# Patient Record
Sex: FEMALE | Race: WHITE | NOT HISPANIC OR LATINO | Employment: OTHER | URBAN - METROPOLITAN AREA
[De-identification: names, ages, dates, MRNs, and addresses within clinical notes are randomized per-mention and may not be internally consistent; named-entity substitution may affect disease eponyms.]

---

## 2023-12-18 ENCOUNTER — HOSPITAL ENCOUNTER (INPATIENT)
Facility: HOSPITAL | Age: 68
LOS: 4 days | DRG: 988 | End: 2023-12-22
Attending: EMERGENCY MEDICINE | Admitting: INTERNAL MEDICINE
Payer: MEDICARE

## 2023-12-18 DIAGNOSIS — E11.622 DIABETIC ULCER OF ANKLE: ICD-10-CM

## 2023-12-18 DIAGNOSIS — R07.9 CHEST PAIN: ICD-10-CM

## 2023-12-18 DIAGNOSIS — I70.249: ICD-10-CM

## 2023-12-18 DIAGNOSIS — M25.571 ANKLE PAIN, RIGHT: ICD-10-CM

## 2023-12-18 DIAGNOSIS — E11.621 DIABETIC ULCER OF RIGHT HEEL ASSOCIATED WITH TYPE 2 DIABETES MELLITUS, UNSPECIFIED ULCER STAGE: Primary | ICD-10-CM

## 2023-12-18 DIAGNOSIS — L97.309 DIABETIC ULCER OF ANKLE: ICD-10-CM

## 2023-12-18 DIAGNOSIS — L02.91 ABSCESS: ICD-10-CM

## 2023-12-18 DIAGNOSIS — L97.419 DIABETIC ULCER OF RIGHT HEEL ASSOCIATED WITH TYPE 2 DIABETES MELLITUS, UNSPECIFIED ULCER STAGE: Primary | ICD-10-CM

## 2023-12-18 PROBLEM — T87.9 BKA STUMP COMPLICATION: Status: ACTIVE | Noted: 2023-12-18

## 2023-12-18 PROBLEM — E11.51 DIABETES TYPE 2 WITH ATHEROSCLEROSIS OF ARTERIES OF EXTREMITIES: Status: ACTIVE | Noted: 2023-12-18

## 2023-12-18 PROBLEM — I70.209 DIABETES TYPE 2 WITH ATHEROSCLEROSIS OF ARTERIES OF EXTREMITIES: Status: ACTIVE | Noted: 2023-12-18

## 2023-12-18 LAB
ALBUMIN SERPL BCP-MCNC: 3.1 G/DL (ref 3.5–5.2)
ALP SERPL-CCNC: 121 U/L (ref 55–135)
ALT SERPL W/O P-5'-P-CCNC: 30 U/L (ref 10–44)
ANION GAP SERPL CALC-SCNC: 13 MMOL/L (ref 8–16)
AST SERPL-CCNC: 31 U/L (ref 10–40)
BASOPHILS # BLD AUTO: 0.03 K/UL (ref 0–0.2)
BASOPHILS NFR BLD: 0.2 % (ref 0–1.9)
BILIRUB SERPL-MCNC: 0.4 MG/DL (ref 0.1–1)
BUN SERPL-MCNC: 10 MG/DL (ref 8–23)
CALCIUM SERPL-MCNC: 8.7 MG/DL (ref 8.7–10.5)
CHLORIDE SERPL-SCNC: 98 MMOL/L (ref 95–110)
CO2 SERPL-SCNC: 24 MMOL/L (ref 23–29)
CREAT SERPL-MCNC: 0.4 MG/DL (ref 0.5–1.4)
DIFFERENTIAL METHOD BLD: ABNORMAL
EOSINOPHIL # BLD AUTO: 0 K/UL (ref 0–0.5)
EOSINOPHIL NFR BLD: 0 % (ref 0–8)
ERYTHROCYTE [DISTWIDTH] IN BLOOD BY AUTOMATED COUNT: 12.7 % (ref 11.5–14.5)
EST. GFR  (NO RACE VARIABLE): >60 ML/MIN/1.73 M^2
GLUCOSE SERPL-MCNC: 198 MG/DL (ref 70–110)
GLUCOSE SERPL-MCNC: 221 MG/DL (ref 70–110)
GLUCOSE SERPL-MCNC: 233 MG/DL (ref 70–110)
GLUCOSE SERPL-MCNC: 245 MG/DL (ref 70–110)
HCT VFR BLD AUTO: 37 % (ref 37–48.5)
HGB BLD-MCNC: 12.1 G/DL (ref 12–16)
IMM GRANULOCYTES # BLD AUTO: 0.11 K/UL (ref 0–0.04)
IMM GRANULOCYTES NFR BLD AUTO: 0.7 % (ref 0–0.5)
LACTATE SERPL-SCNC: 1.1 MMOL/L (ref 0.5–1.9)
LACTATE SERPL-SCNC: 1.6 MMOL/L (ref 0.5–1.9)
LYMPHOCYTES # BLD AUTO: 2.5 K/UL (ref 1–4.8)
LYMPHOCYTES NFR BLD: 16.2 % (ref 18–48)
MAGNESIUM SERPL-MCNC: 1.6 MG/DL (ref 1.6–2.6)
MCH RBC QN AUTO: 27.7 PG (ref 27–31)
MCHC RBC AUTO-ENTMCNC: 32.7 G/DL (ref 32–36)
MCV RBC AUTO: 85 FL (ref 82–98)
MONOCYTES # BLD AUTO: 1 K/UL (ref 0.3–1)
MONOCYTES NFR BLD: 6.3 % (ref 4–15)
NEUTROPHILS # BLD AUTO: 11.8 K/UL (ref 1.8–7.7)
NEUTROPHILS NFR BLD: 76.6 % (ref 38–73)
NRBC BLD-RTO: 0 /100 WBC
PLATELET # BLD AUTO: 527 K/UL (ref 150–450)
PMV BLD AUTO: 9.5 FL (ref 9.2–12.9)
POTASSIUM SERPL-SCNC: 3.6 MMOL/L (ref 3.5–5.1)
PROT SERPL-MCNC: 7 G/DL (ref 6–8.4)
RBC # BLD AUTO: 4.37 M/UL (ref 4–5.4)
SODIUM SERPL-SCNC: 135 MMOL/L (ref 136–145)
WBC # BLD AUTO: 15.44 K/UL (ref 3.9–12.7)

## 2023-12-18 PROCEDURE — 83605 ASSAY OF LACTIC ACID: CPT | Performed by: EMERGENCY MEDICINE

## 2023-12-18 PROCEDURE — 82962 GLUCOSE BLOOD TEST: CPT

## 2023-12-18 PROCEDURE — 83735 ASSAY OF MAGNESIUM: CPT | Performed by: EMERGENCY MEDICINE

## 2023-12-18 PROCEDURE — 36415 COLL VENOUS BLD VENIPUNCTURE: CPT | Performed by: EMERGENCY MEDICINE

## 2023-12-18 PROCEDURE — 83036 HEMOGLOBIN GLYCOSYLATED A1C: CPT | Performed by: INTERNAL MEDICINE

## 2023-12-18 PROCEDURE — 80053 COMPREHEN METABOLIC PANEL: CPT | Performed by: EMERGENCY MEDICINE

## 2023-12-18 PROCEDURE — 87040 BLOOD CULTURE FOR BACTERIA: CPT | Mod: 59 | Performed by: EMERGENCY MEDICINE

## 2023-12-18 PROCEDURE — 96365 THER/PROPH/DIAG IV INF INIT: CPT

## 2023-12-18 PROCEDURE — 83605 ASSAY OF LACTIC ACID: CPT | Mod: 91 | Performed by: INTERNAL MEDICINE

## 2023-12-18 PROCEDURE — 25000003 PHARM REV CODE 250: Performed by: INTERNAL MEDICINE

## 2023-12-18 PROCEDURE — 12000002 HC ACUTE/MED SURGE SEMI-PRIVATE ROOM

## 2023-12-18 PROCEDURE — 63600175 PHARM REV CODE 636 W HCPCS: Performed by: INTERNAL MEDICINE

## 2023-12-18 PROCEDURE — 85025 COMPLETE CBC W/AUTO DIFF WBC: CPT | Performed by: EMERGENCY MEDICINE

## 2023-12-18 PROCEDURE — 36415 COLL VENOUS BLD VENIPUNCTURE: CPT | Performed by: INTERNAL MEDICINE

## 2023-12-18 PROCEDURE — 99285 EMERGENCY DEPT VISIT HI MDM: CPT | Mod: 25

## 2023-12-18 PROCEDURE — 99223 1ST HOSP IP/OBS HIGH 75: CPT | Mod: ,,, | Performed by: STUDENT IN AN ORGANIZED HEALTH CARE EDUCATION/TRAINING PROGRAM

## 2023-12-18 PROCEDURE — 63600175 PHARM REV CODE 636 W HCPCS: Performed by: EMERGENCY MEDICINE

## 2023-12-18 PROCEDURE — 25500020 PHARM REV CODE 255: Performed by: INTERNAL MEDICINE

## 2023-12-18 RX ORDER — HYDROMORPHONE HYDROCHLORIDE 1 MG/ML
1 INJECTION, SOLUTION INTRAMUSCULAR; INTRAVENOUS; SUBCUTANEOUS EVERY 4 HOURS PRN
Status: DISCONTINUED | OUTPATIENT
Start: 2023-12-18 | End: 2023-12-22 | Stop reason: HOSPADM

## 2023-12-18 RX ORDER — IBUPROFEN 200 MG
24 TABLET ORAL
Status: DISCONTINUED | OUTPATIENT
Start: 2023-12-18 | End: 2023-12-22 | Stop reason: HOSPADM

## 2023-12-18 RX ORDER — GLUCAGON 1 MG
1 KIT INJECTION
Status: DISCONTINUED | OUTPATIENT
Start: 2023-12-18 | End: 2023-12-22 | Stop reason: HOSPADM

## 2023-12-18 RX ORDER — INSULIN ASPART 100 [IU]/ML
0-10 INJECTION, SOLUTION INTRAVENOUS; SUBCUTANEOUS
Status: DISCONTINUED | OUTPATIENT
Start: 2023-12-18 | End: 2023-12-22 | Stop reason: HOSPADM

## 2023-12-18 RX ORDER — SODIUM CHLORIDE 0.9 % (FLUSH) 0.9 %
10 SYRINGE (ML) INJECTION EVERY 12 HOURS PRN
Status: DISCONTINUED | OUTPATIENT
Start: 2023-12-18 | End: 2023-12-22 | Stop reason: HOSPADM

## 2023-12-18 RX ORDER — IBUPROFEN 200 MG
16 TABLET ORAL
Status: DISCONTINUED | OUTPATIENT
Start: 2023-12-18 | End: 2023-12-22 | Stop reason: HOSPADM

## 2023-12-18 RX ORDER — ENOXAPARIN SODIUM 100 MG/ML
40 INJECTION SUBCUTANEOUS EVERY 24 HOURS
Status: DISCONTINUED | OUTPATIENT
Start: 2023-12-18 | End: 2023-12-22 | Stop reason: HOSPADM

## 2023-12-18 RX ORDER — HYDROMORPHONE HYDROCHLORIDE 1 MG/ML
1 INJECTION, SOLUTION INTRAMUSCULAR; INTRAVENOUS; SUBCUTANEOUS EVERY 6 HOURS PRN
Status: DISCONTINUED | OUTPATIENT
Start: 2023-12-18 | End: 2023-12-18

## 2023-12-18 RX ORDER — VANCOMYCIN HCL IN 5 % DEXTROSE 1G/250ML
2000 PLASTIC BAG, INJECTION (ML) INTRAVENOUS ONCE
Status: COMPLETED | OUTPATIENT
Start: 2023-12-18 | End: 2023-12-18

## 2023-12-18 RX ORDER — NALOXONE HCL 0.4 MG/ML
0.02 VIAL (ML) INJECTION
Status: DISCONTINUED | OUTPATIENT
Start: 2023-12-18 | End: 2023-12-22 | Stop reason: HOSPADM

## 2023-12-18 RX ADMIN — VANCOMYCIN HYDROCHLORIDE 2000 MG: 1 INJECTION, POWDER, LYOPHILIZED, FOR SOLUTION INTRAVENOUS at 03:12

## 2023-12-18 RX ADMIN — ENOXAPARIN SODIUM 40 MG: 40 INJECTION SUBCUTANEOUS at 04:12

## 2023-12-18 RX ADMIN — HYDROMORPHONE HYDROCHLORIDE 1 MG: 1 INJECTION, SOLUTION INTRAMUSCULAR; INTRAVENOUS; SUBCUTANEOUS at 04:12

## 2023-12-18 RX ADMIN — INSULIN ASPART 2 UNITS: 100 INJECTION, SOLUTION INTRAVENOUS; SUBCUTANEOUS at 08:12

## 2023-12-18 RX ADMIN — HYDROMORPHONE HYDROCHLORIDE 1 MG: 1 INJECTION, SOLUTION INTRAMUSCULAR; INTRAVENOUS; SUBCUTANEOUS at 10:12

## 2023-12-18 RX ADMIN — IOHEXOL 100 ML: 350 INJECTION, SOLUTION INTRAVENOUS at 05:12

## 2023-12-18 RX ADMIN — PIPERACILLIN SODIUM AND TAZOBACTAM SODIUM 3.38 G: 3; .375 INJECTION, POWDER, LYOPHILIZED, FOR SOLUTION INTRAVENOUS at 08:12

## 2023-12-18 NOTE — ASSESSMENT & PLAN NOTE
"Patient's FSGs are uncontrolled due to hyperglycemia on current medication regimen.  Last A1c reviewed- No results found for: "LABA1C", "HGBA1C"  Most recent fingerstick glucose reviewed- No results for input(s): "POCTGLUCOSE" in the last 24 hours.  Current correctional scale  High  Increase anti-hyperglycemic dose as follows-   Antihyperglycemics (From admission, onward)      None          Hold Oral hypoglycemics while patient is in the hospital.  A1c and SSI   May need to start insulin   Woody consistency at calf and the whole leg and worrisome for compartment syndrome and CT stat and consult surgeon     "

## 2023-12-18 NOTE — ED PROVIDER NOTES
Encounter Date: 12/18/2023       History     Chief Complaint   Patient presents with    Wound Check     Diabetic ulcer to right ankle. States it has been draining for a few days. Has not seen wound care and is concerned for infection. Area appears to be red swollen and draining.      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  No past surgical history on file.  No family history on file.     Review of Systems   Constitutional:  Positive for fatigue. Negative for chills and fever.   HENT:  Negative for congestion.    Respiratory:  Negative for shortness of breath.    Cardiovascular:  Negative for chest pain.   Skin:  Positive for color change and wound.   All other systems reviewed and are negative.      Physical Exam     Initial Vitals [12/18/23 1224]   BP Pulse Resp Temp SpO2   (!) 150/80 82 18 99.1 °F (37.3 °C) 100 %      MAP       --         Physical Exam    Constitutional: She appears well-developed and well-nourished. No distress.   HENT:   Head: Normocephalic and atraumatic.   Right Ear: External ear normal.   Left Ear: External ear normal.   Mouth/Throat: Oropharynx is clear and moist.   Eyes: Conjunctivae and EOM are normal. Pupils are equal, round, and reactive to light.   Neck: Neck supple.   Normal range of motion.  Cardiovascular:  Normal rate, regular rhythm, normal heart sounds and intact distal pulses.           Pulmonary/Chest: Breath sounds normal. No respiratory distress.   Abdominal: Abdomen is soft. Bowel sounds are normal. There is no abdominal tenderness.    Musculoskeletal:         General: No edema. Normal range of motion.      Cervical back: Normal range of motion and neck supple.      Comments: Ulceration noted to the posterior aspect of the right ankle with surrounding erythema and drainage tenderness to the area +1 dorsalis pedis and posterior tibial pulses     Neurological: She is alert and oriented to person, place, and time. She has normal strength. GCS score is 15. GCS eye subscore is 4. GCS verbal subscore is 5. GCS motor subscore is 6.   Skin: Skin is warm and dry. Capillary refill takes less than 2 seconds. No rash noted.   Psychiatric: She has a normal mood and affect. Her behavior is normal.         ED Course   Procedures  Labs Reviewed   CBC W/ AUTO DIFFERENTIAL - Abnormal; Notable for the following components:       Result Value    WBC 15.44 (*)     Platelets 527 (*)     Immature Granulocytes 0.7 (*)     Gran # (ANC) 11.8 (*)     Immature Grans (Abs) 0.11 (*)     Gran % 76.6 (*)     Lymph % 16.2 (*)     All other components within normal limits   CULTURE, BLOOD   CULTURE, BLOOD    Narrative:     Collection has been rescheduled by [facility] at 12/18/2023 13:58 Reason:   Patient refused notified ALBIN alexander   Collection has been rescheduled by [facility] at 12/18/2023 13:58 Reason:   Patient refused notified ALBIN alexander    LACTIC ACID, PLASMA   COMPREHENSIVE METABOLIC PANEL   MAGNESIUM          Imaging Results              X-Ray Ankle Complete Right (Final result)  Result time 12/18/23 14:34:46      Final result by Yohan Hamm MD (12/18/23 14:34:46)                   Narrative:    Right ankle 3 views    CLINICAL DATA: Right ankle pain    FINDINGS: 3 views are negative for fracture or dislocation. No osseous destructive lesion or significant arthritic change is identified.    Atherosclerotic calcification of the posterior tibial artery is noted.    IMPRESSION:  1. No acute radiographic abnormalities.  2. Mild sclerotic calcification of the posterior tibial  artery.    Electronically signed by:  Yohan Hamm MD  12/18/2023 02:34 PM [facility] [place] Workstation: [id number]                                     Medications   vancomycin - pharmacy to dose (has no administration in time range)   vancomycin in dextrose 5 % 1 gram/250 mL IVPB 2,000 mg (has no administration in time range)     Medical Decision Making  Laboratory evaluation reviewed I have discussed patient's findings with Dr. Iniguez who will evaluate patient in the emergency department for admission I have ordered vancomycin in the ER I have obtained lactate and cultures    Amount and/or Complexity of Data Reviewed  Labs: ordered.  Radiology: ordered.    Risk  Prescription drug management.                                      Clinical Impression:  Final diagnoses:  [M25.571] Ankle pain, right  [E11.621, L97.419] Diabetic ulcer of right heel associated with type 2 diabetes mellitus, unspecified ulcer stage (Primary)          ED Disposition Condition    Admit Stable                Bakari Esquivel MD  12/18/23 1839

## 2023-12-18 NOTE — PHARMACY MED REC
"              .        Admission Medication History     The home medication history was taken by Mimi Cruz.    You may go to "Admission" then "Reconcile Home Medications" tabs to review and/or act upon these items.     The home medication list has been updated by the Pharmacy department.   Please read ALL comments highlighted in yellow.   Please address this information as you see fit.    Feel free to contact us if you have any questions or require assistance.        Medications listed below were obtained from: Patient/family and Analytic software- [employer]  No current facility-administered medications on file prior to encounter.     No current outpatient medications on file prior to encounter.           Mimi Cruz  EXT 1925      "

## 2023-12-18 NOTE — Clinical Note
Diagnosis: Ankle pain, right [253052]  Future Attending Provider: DANIEL VUONG [[id number]]  Place in Observation: [facility] [[id number]]  Admitting Provider:: DANIEL VUONG [[id number]]

## 2023-12-18 NOTE — SUBJECTIVE & OBJECTIVE
History reviewed. No pertinent past medical history.    History reviewed. No pertinent surgical history.    Review of patient's allergies indicates:  No Known Allergies    No current facility-administered medications on file prior to encounter.     No current outpatient medications on file prior to encounter.     Family History    None       Tobacco Use    Smoking status: Not on file    Smokeless tobacco: Not on file   Substance and Sexual Activity    Alcohol use: Not on file    Drug use: Not Currently    Sexual activity: Not Currently     Review of Systems   Constitutional:  Negative for activity change and fever.   Respiratory:  Negative for shortness of breath and wheezing.    Cardiovascular:  Positive for leg swelling. Negative for chest pain.   Gastrointestinal:  Negative for abdominal distention and abdominal pain.   Genitourinary:  Negative for difficulty urinating.   Skin:  Negative for color change.   Neurological:  Negative for dizziness and facial asymmetry.   Psychiatric/Behavioral:  Negative for agitation and confusion.      Objective:     Vital Signs (Most Recent):  Temp: 99.1 °F (37.3 °C) (12/18/23 1224)  Pulse: 84 (12/18/23 1517)  Resp: 18 (12/18/23 1405)  BP: (!) 195/87 (12/18/23 1517)  SpO2: 98 % (12/18/23 1517) Vital Signs (24h Range):  Temp:  [99.1 °F (37.3 °C)] 99.1 °F (37.3 °C)  Pulse:  [82-84] 84  Resp:  [18] 18  SpO2:  [98 %-100 %] 98 %  BP: (146-195)/(64-87) 195/87     Weight: 86.2 kg (190 lb)  Body mass index is 31.62 kg/m².     Physical Exam  Vitals and nursing note reviewed.   HENT:      Head: Normocephalic and atraumatic.   Eyes:      Conjunctiva/sclera: Conjunctivae normal.   Neck:      Vascular: No JVD.   Cardiovascular:      Heart sounds: Normal heart sounds.   Pulmonary:      Effort: Pulmonary effort is normal.      Breath sounds: Normal breath sounds.   Abdominal:      Palpations: Abdomen is soft.   Musculoskeletal:         General: Normal range of motion.   Skin:     General: Skin  "is warm.   Neurological:      Mental Status: She is alert and oriented to person, place, and time.   Psychiatric:         Mood and Affect: Mood normal.                Significant Labs: All pertinent labs within the past 24 hours have been reviewed.  CBC:   Recent Labs   Lab 12/18/23  1355   WBC 15.44*   HGB 12.1   HCT 37.0   *     CMP:   Recent Labs   Lab 12/18/23  1355   *   K 3.6   CL 98   CO2 24   *   BUN 10   CREATININE 0.4*   CALCIUM 8.7   PROT 7.0   ALBUMIN 3.1*   BILITOT 0.4   ALKPHOS 121   AST 31   ALT 30   ANIONGAP 13     Cardiac Markers: No results for input(s): "CKMB", "MYOGLOBIN", "BNP", "TROPISTAT" in the last 48 hours.    Significant Imaging: I have reviewed all pertinent imaging results/findings within the past 24 hours.  "

## 2023-12-18 NOTE — H&P
[facility] - Emergency Dept  Hospital Medicine  History & Physical    Patient Name: Divya Meek  MRN: [id number]  Patient Class: IP- Inpatient  Admission Date: 12/18/2023  Attending Physician: Alexi Iniguez MD   Primary Care Provider: No primary care provider on file.         Patient information was obtained from patient and ER records.     Subjective:     Principal Problem:Diabetic ulcer of ankle    Chief Complaint:   Chief Complaint   Patient presents with    Wound Check     Diabetic ulcer to right ankle. States it has been draining for a few days. Has not seen wound care and is concerned for infection. Area appears to be red swollen and draining.         HPI: [age] year female who resides in the [facility] home, diabetes, noncompliance, she was not taking any diabetic medications for 2 years, left below-knee amputation came to hospital with posterior aspect of the heel ulcer and infection and cellulitis .  Reported ulcer to her right ankle that has begun 2 months ago and she had a fall about 1 month ago and got worse and draining .  Pain is worsening and feeling pain at the calf and came to ER .  She had left leg BKA in sept 2022. She was not taking DM medications more than 1 year ago. She did BKA in [facility] . She said she was on insulin during hospital stay   PMH as above   PSH . Thymus surgery ? For GBS , laparotomy for abdominal abscess and C section   Family history not significant        History reviewed. No pertinent past medical history.    History reviewed. No pertinent surgical history.    Review of patient's allergies indicates:  No Known Allergies    No current facility-administered medications on file prior to encounter.     No current outpatient medications on file prior to encounter.     Family History    None       Tobacco Use    Smoking status: Not on file    Smokeless tobacco: Not on file   Substance and Sexual Activity    Alcohol use: Not on file    Drug use: Not Currently     Sexual activity: Not Currently     Review of Systems   Constitutional:  Negative for activity change and fever.   Respiratory:  Negative for shortness of breath and wheezing.    Cardiovascular:  Positive for leg swelling. Negative for chest pain.   Gastrointestinal:  Negative for abdominal distention and abdominal pain.   Genitourinary:  Negative for difficulty urinating.   Skin:  Negative for color change.   Neurological:  Negative for dizziness and facial asymmetry.   Psychiatric/Behavioral:  Negative for agitation and confusion.      Objective:     Vital Signs (Most Recent):  Temp: 99.1 °F (37.3 °C) (12/18/23 1224)  Pulse: 84 (12/18/23 1517)  Resp: 18 (12/18/23 1405)  BP: (!) 195/87 (12/18/23 1517)  SpO2: 98 % (12/18/23 1517) Vital Signs (24h Range):  Temp:  [99.1 °F (37.3 °C)] 99.1 °F (37.3 °C)  Pulse:  [82-84] 84  Resp:  [18] 18  SpO2:  [98 %-100 %] 98 %  BP: (146-195)/(64-87) 195/87     Weight: 86.2 kg (190 lb)  Body mass index is 31.62 kg/m².     Physical Exam  Vitals and nursing note reviewed.   HENT:      Head: Normocephalic and atraumatic.   Eyes:      Conjunctiva/sclera: Conjunctivae normal.   Neck:      Vascular: No JVD.   Cardiovascular:      Heart sounds: Normal heart sounds.   Pulmonary:      Effort: Pulmonary effort is normal.      Breath sounds: Normal breath sounds.   Abdominal:      Palpations: Abdomen is soft.   Musculoskeletal:         General: Normal range of motion.   Skin:     General: Skin is warm.   Neurological:      Mental Status: She is alert and oriented to person, place, and time.   Psychiatric:         Mood and Affect: Mood normal.                Significant Labs: All pertinent labs within the past 24 hours have been reviewed.  CBC:   Recent Labs   Lab 12/18/23  1355   WBC 15.44*   HGB 12.1   HCT 37.0   *     CMP:   Recent Labs   Lab 12/18/23  1355   *   K 3.6   CL 98   CO2 24   *   BUN 10   CREATININE 0.4*   CALCIUM 8.7   PROT 7.0   ALBUMIN 3.1*   BILITOT 0.4  "  ALKPHOS 121   AST 31   ALT 30   ANIONGAP 13     Cardiac Markers: No results for input(s): "CKMB", "MYOGLOBIN", "BNP", "TROPISTAT" in the last 48 hours.    Significant Imaging: I have reviewed all pertinent imaging results/findings within the past 24 hours.  Assessment/Plan:     * Diabetic ulcer of ankle  Patient's FSGs are uncontrolled due to hyperglycemia on current medication regimen.  Last A1c reviewed- No results found for: "LABA1C", "HGBA1C"  Most recent fingerstick glucose reviewed- No results for input(s): "POCTGLUCOSE" in the last 24 hours.  Current correctional scale  High  Increase anti-hyperglycemic dose as follows-   Antihyperglycemics (From admission, onward)      None          Hold Oral hypoglycemics while patient is in the hospital.  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

## 2023-12-18 NOTE — ED NOTES
PT REFUSES SECOND SET OF BLOOD CULTURES. PT REFUSES TO BE STUCK AGAIN AND STATES IT IS NOT NECESSARY. PT ADVISED THAT A SECOND SET OF BLOOD CULTURES IS STANDARD FOR SEPTIC WORKUPS. MD TO BE NOTIFIED

## 2023-12-18 NOTE — UM SECONDARY REVIEW
Other (see comment)  front end UR    Level of Care Issue    Denied IP or OBS - not appropriate  Diabetic foot ulcer not meeting IP criteria at time of initial review.  [employer] and provider believes pt will be here > 2 Midnights.  He has ordered a CT to assess and r/o compartment syndrome.  He is requesting IP.

## 2023-12-18 NOTE — HPI
[age] year female who resides in the group home, diabetes, noncompliance, she was not taking any diabetic medications for 2 years, left below-knee amputation came to hospital with posterior aspect of the heel ulcer and infection and cellulitis .  Reported ulcer to her right ankle that has begun 2 months ago and she had a fall about 1 month ago and got worse and draining .  Pain is worsening and feeling pain at the calf and came to ER .  She had left leg BKA in sept 2022. She was not taking DM medications more than 1 year ago. She did BKA in [place] . She said she was on insulin during hospital stay   PMH as above   PSH . Thymus surgery ? For GBS , laparotomy for abdominal abscess and C section   Family history not significant

## 2023-12-18 NOTE — ED NOTES
PT REFUSING IV AT THIS TIME. PT TOLD  THAT SHE COULD STICK HER FOR LABS BUT THAT SHE DOES NOT WANT AN IV TILL LABS COME BACK AND SHE ABSOLUTELY NEEDS IT. PT WAS INFORMED THAT THIS WOULD DELAY HER CARE AS IV ANTIBIOTICS WERE ALREADY ORDERED.

## 2023-12-19 LAB
BASOPHILS # BLD AUTO: 0.05 K/UL (ref 0–0.2)
BASOPHILS NFR BLD: 0.3 % (ref 0–1.9)
DIFFERENTIAL METHOD BLD: ABNORMAL
EOSINOPHIL # BLD AUTO: 0 K/UL (ref 0–0.5)
EOSINOPHIL NFR BLD: 0.1 % (ref 0–8)
ERYTHROCYTE [DISTWIDTH] IN BLOOD BY AUTOMATED COUNT: 12.4 % (ref 11.5–14.5)
ESTIMATED AVG GLUCOSE: 295 MG/DL (ref 68–131)
GLUCOSE SERPL-MCNC: 219 MG/DL (ref 70–110)
GLUCOSE SERPL-MCNC: 219 MG/DL (ref 70–110)
GLUCOSE SERPL-MCNC: 237 MG/DL (ref 70–110)
GLUCOSE SERPL-MCNC: 243 MG/DL (ref 70–110)
HBA1C MFR BLD: 11.9 % (ref 4.5–6.2)
HCT VFR BLD AUTO: 37 % (ref 37–48.5)
HGB BLD-MCNC: 12.4 G/DL (ref 12–16)
IMM GRANULOCYTES # BLD AUTO: 0.1 K/UL (ref 0–0.04)
IMM GRANULOCYTES NFR BLD AUTO: 0.6 % (ref 0–0.5)
LYMPHOCYTES # BLD AUTO: 2.1 K/UL (ref 1–4.8)
LYMPHOCYTES NFR BLD: 13.8 % (ref 18–48)
MCH RBC QN AUTO: 28.1 PG (ref 27–31)
MCHC RBC AUTO-ENTMCNC: 33.5 G/DL (ref 32–36)
MCV RBC AUTO: 84 FL (ref 82–98)
MONOCYTES # BLD AUTO: 1.1 K/UL (ref 0.3–1)
MONOCYTES NFR BLD: 7 % (ref 4–15)
NEUTROPHILS # BLD AUTO: 12 K/UL (ref 1.8–7.7)
NEUTROPHILS NFR BLD: 78.2 % (ref 38–73)
NRBC BLD-RTO: 0 /100 WBC
PLATELET # BLD AUTO: 489 K/UL (ref 150–450)
PMV BLD AUTO: 9.4 FL (ref 9.2–12.9)
RBC # BLD AUTO: 4.42 M/UL (ref 4–5.4)
WBC # BLD AUTO: 15.39 K/UL (ref 3.9–12.7)

## 2023-12-19 PROCEDURE — 12000002 HC ACUTE/MED SURGE SEMI-PRIVATE ROOM

## 2023-12-19 PROCEDURE — 25000003 PHARM REV CODE 250: Performed by: INTERNAL MEDICINE

## 2023-12-19 PROCEDURE — 99232 SBSQ HOSP IP/OBS MODERATE 35: CPT | Mod: ,,, | Performed by: STUDENT IN AN ORGANIZED HEALTH CARE EDUCATION/TRAINING PROGRAM

## 2023-12-19 PROCEDURE — 36415 COLL VENOUS BLD VENIPUNCTURE: CPT | Performed by: INTERNAL MEDICINE

## 2023-12-19 PROCEDURE — C1751 CATH, INF, PER/CENT/MIDLINE: HCPCS

## 2023-12-19 PROCEDURE — 63600175 PHARM REV CODE 636 W HCPCS: Performed by: INTERNAL MEDICINE

## 2023-12-19 PROCEDURE — 85025 COMPLETE CBC W/AUTO DIFF WBC: CPT | Performed by: INTERNAL MEDICINE

## 2023-12-19 RX ORDER — HYDROCODONE BITARTRATE AND ACETAMINOPHEN 5; 325 MG/1; MG/1
1 TABLET ORAL EVERY 6 HOURS PRN
Status: DISCONTINUED | OUTPATIENT
Start: 2023-12-19 | End: 2023-12-22 | Stop reason: HOSPADM

## 2023-12-19 RX ADMIN — PIPERACILLIN SODIUM AND TAZOBACTAM SODIUM 3.38 G: 3; .375 INJECTION, POWDER, LYOPHILIZED, FOR SOLUTION INTRAVENOUS at 09:12

## 2023-12-19 RX ADMIN — HYDROMORPHONE HYDROCHLORIDE 1 MG: 1 INJECTION, SOLUTION INTRAMUSCULAR; INTRAVENOUS; SUBCUTANEOUS at 12:12

## 2023-12-19 RX ADMIN — HYDROMORPHONE HYDROCHLORIDE 1 MG: 1 INJECTION, SOLUTION INTRAMUSCULAR; INTRAVENOUS; SUBCUTANEOUS at 04:12

## 2023-12-19 RX ADMIN — INSULIN ASPART 4 UNITS: 100 INJECTION, SOLUTION INTRAVENOUS; SUBCUTANEOUS at 06:12

## 2023-12-19 RX ADMIN — ENOXAPARIN SODIUM 40 MG: 40 INJECTION SUBCUTANEOUS at 06:12

## 2023-12-19 RX ADMIN — INSULIN ASPART 2 UNITS: 100 INJECTION, SOLUTION INTRAVENOUS; SUBCUTANEOUS at 11:12

## 2023-12-19 RX ADMIN — HYDROMORPHONE HYDROCHLORIDE 1 MG: 1 INJECTION, SOLUTION INTRAMUSCULAR; INTRAVENOUS; SUBCUTANEOUS at 06:12

## 2023-12-19 RX ADMIN — VANCOMYCIN HYDROCHLORIDE 1500 MG: 1.5 INJECTION, POWDER, LYOPHILIZED, FOR SOLUTION INTRAVENOUS at 02:12

## 2023-12-19 RX ADMIN — PIPERACILLIN SODIUM AND TAZOBACTAM SODIUM 3.38 G: 3; .375 INJECTION, POWDER, LYOPHILIZED, FOR SOLUTION INTRAVENOUS at 04:12

## 2023-12-19 NOTE — SUBJECTIVE & OBJECTIVE
Interval History:     Review of Systems  Objective:     Vital Signs (Most Recent):  Temp: 97.7 °F (36.5 °C) (12/19/23 0734)  Pulse: 74 (12/19/23 0734)  Resp: 18 (12/19/23 0734)  BP: (!) 159/72 (12/19/23 0734)  SpO2: 98 % (12/19/23 0734) Vital Signs (24h Range):  Temp:  [37.4 °F (3 °C)-100.4 °F (38 °C)] 97.7 °F (36.5 °C)  Pulse:  [74-84] 74  Resp:  [10-18] 18  SpO2:  [95 %-100 %] 98 %  BP: (143-195)/(64-87) 159/72     Weight: 84.7 kg (186 lb 11.7 oz)  Body mass index is 31.07 kg/m².    Intake/Output Summary (Last 24 hours) at 12/19/2023 1107  Last data filed at 12/19/2023 0553  Gross per 24 hour   Intake 930 ml   Output 700 ml   Net 230 ml         Physical Exam  Vitals and nursing note reviewed.   HENT:      Head: Normocephalic and atraumatic.   Eyes:      Conjunctiva/sclera: Conjunctivae normal.   Neck:      Vascular: No JVD.   Cardiovascular:      Heart sounds: Normal heart sounds.   Pulmonary:      Effort: Pulmonary effort is normal.      Breath sounds: Normal breath sounds.   Abdominal:      Palpations: Abdomen is soft.   Musculoskeletal:         General: Normal range of motion.   Skin:     General: Skin is warm.   Neurological:      Mental Status: She is alert and oriented to person, place, and time.   Psychiatric:         Mood and Affect: Mood normal.             Significant Labs: All pertinent labs within the past 24 hours have been reviewed.  CBC:   Recent Labs   Lab 12/18/23  1355   WBC 15.44*   HGB 12.1   HCT 37.0   *     CMP:   Recent Labs   Lab 12/18/23  1355   *   K 3.6   CL 98   CO2 24   *   BUN 10   CREATININE 0.4*   CALCIUM 8.7   PROT 7.0   ALBUMIN 3.1*   BILITOT 0.4   ALKPHOS 121   AST 31   ALT 30   ANIONGAP 13       Significant Imaging: I have reviewed all pertinent imaging results/findings within the past 24 hours.

## 2023-12-19 NOTE — PLAN OF CARE
[facility]  Initial Discharge Assessment       Primary Care Provider: No, Primary Doctor    Admission Diagnosis: Ankle pain, right [M25.571]  Diabetic ulcer of right heel associated with type 2 diabetes mellitus, unspecified ulcer stage [E11.621, L97.419]    Admission Date: 12/18/2023  Expected Discharge Date:      completed discharge assessment with Pt at bedside. Pt AAOx4s. Pt lives at group home/ personal care home with roomates. Demographics and insurance verified. Pt does not go to the doctor. No home health. No dialysis. Pt completes ADLs with the assistance of prosthesis and a rolling walker. Pt to discharge home will need transportation home. Pt has no other needs to be addressed at this time.    Transition of Care Barriers: No family/friends to help, Mobility, Social    Payor: MEDICARE / Plan: MEDICARE PART A & B / Product Type: Government /     No emergency contact information on file.    Discharge Plan A: Group Home  Discharge Plan B: Group home    No Pharmacies Listed    Initial Assessment (most recent)       Adult Discharge Assessment - 12/19/23 1523          Discharge Assessment    Assessment Type Discharge Planning Assessment     Confirmed/corrected address, phone number and insurance Yes     Confirmed Demographics Correct on Facesheet     Source of Information patient     Does patient/caregiver understand observation status Yes     Communicated SHONNA with patient/caregiver Date not available/Unable to determine     Reason For Admission Diabetic ulcer of ankle     People in Home --   Roomate    Facility Arrived From: Group Home     Do you expect to return to your current living situation? Yes     Do you have help at home or someone to help you manage your care at home? No     Prior to hospitilization cognitive status: Alert/Oriented     Current cognitive status: Alert/Oriented     Walking or Climbing Stairs Difficulty yes     Walking or Climbing Stairs ambulation difficulty,  requires equipment     Home Accessibility wheelchair accessible     Home Layout Able to live on 1st floor     Equipment Currently Used at Home walker, rolling;prosthesis     Readmission within 30 days? No     Patient currently being followed by outpatient case management? No     Do you currently have service(s) that help you manage your care at home? No     Do you take prescription medications? No     Do you have prescription coverage? Yes     Coverage Payor: MEDICARE - MEDICARE PART A & B -     Do you have any problems affording any of your prescribed medications? No     Who is going to help you get home at discharge? Needs Transportation home     Are you on dialysis? No     Do you take coumadin? No     Discharge Plan A Group Home     Discharge Plan B Group home     DME Needed Upon Discharge  none     Discharge Plan discussed with: Patient     Transition of Care Barriers No family/friends to help;Mobility;Social

## 2023-12-19 NOTE — PROGRESS NOTES
"[facility] Medicine  Progress Note    Patient Name: Divya Meek  MRN: [id number]  Patient Class: IP- Inpatient   Admission Date: 12/18/2023  Length of Stay: 1 days  Attending Physician: Alexi Iniguez MD  Primary Care Provider: Janet, Primary Doctor        Subjective:     Principal Problem:Diabetic ulcer of ankle        HPI:  [age] year female who resides in the group home, diabetes, noncompliance, she was not taking any diabetic medications for 2 years, left below-knee amputation came to hospital with posterior aspect of the heel ulcer and infection and cellulitis .  Reported ulcer to her right ankle that has begun 2 months ago and she had a fall about 1 month ago and got worse and draining .  Pain is worsening and feeling pain at the calf and came to ER .  She had left leg BKA in sept 2022. She was not taking DM medications more than 1 year ago. She did BKA in [facility] . She said she was on insulin during hospital stay   PMH as above   PSH . Thymus surgery ? For GBS , laparotomy for abdominal abscess and C section   Family history not significant        Overview/Hospital Course:  IR US guided aspiration ordered  Patient is upset she was NPO and wants to eat and sign out AMA. Explained risk even death to her and she hold back. But she can sign out AMA again . D/w pt and explained with RN and charge nurse     No new subjective & objective note has been filed under this hospital service since the last note was generated.      Assessment/Plan:      * Diabetic ulcer of ankle  Patient's FSGs are uncontrolled due to hyperglycemia on current medication regimen.  Last A1c reviewed-   Lab Results   Component Value Date    HGBA1C 11.9 (H) 12/18/2023     Most recent fingerstick glucose reviewed- No results for input(s): "POCTGLUCOSE" in the last 24 hours.  Current correctional scale  High  Increase anti-hyperglycemic dose as follows-   Antihyperglycemics (From admission, onward)      Start     Stop " "Route Frequency Ordered    12/18/23 1647  insulin aspart U-100 pen 0-10 Units         -- SubQ Before meals & nightly PRN 12/18/23 1547          Hold Oral hypoglycemics while patient is in the hospital.  A1c and SSI   May need to start insulin   CT noted with possible abscess and IR aspiration US guided ordered   NPO and can feed if needed      Diabetic ulcer of right heel associated with type 2 diabetes mellitus  Patient's FSGs are uncontrolled due to hyperglycemia on current medication regimen.  Last A1c reviewed-   Lab Results   Component Value Date    HGBA1C 11.9 (H) 12/18/2023     Most recent fingerstick glucose reviewed- No results for input(s): "POCTGLUCOSE" in the last 24 hours.  Current correctional scale  Medium  Increase anti-hyperglycemic dose as follows-   Antihyperglycemics (From admission, onward)      Start     Stop Route Frequency Ordered    12/18/23 1647  insulin aspart U-100 pen 0-10 Units         -- SubQ Before meals & nightly PRN 12/18/23 1547          Hold Oral hypoglycemics while patient is in the hospital.    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

## 2023-12-19 NOTE — PROGRESS NOTES
VANCOMYCIN PHARMACOKINETIC NOTE:  Vancomycin Day # 1    Objective/Assessment:    Diagnosis/Indication for Vancomycin: Bone/Joint infection     68 y.o., female; Actual Body Weight = 84.6 kg (186 lb 8.2 oz).    The patient has the following labs:  12/18/2023 Estimated Creatinine Clearance: 142.5 mL/min (A) (based on SCr of 0.4 mg/dL (L)). Lab Results   Component Value Date    BUN 10 12/18/2023     Lab Results   Component Value Date    WBC 15.44 (H) 12/18/2023            Plan:  Adjust vancomycin dose and/or frequency based on the patient's actual weight and renal function:  Initiate Vancomycin 2000 mg IV once followed by 1500 mg IV  every 12 hours.  Orders have been entered into patient's chart.    Vancomycin dose = 17.7 mg/kg actual body weight    Vancomycin trough level has been ordered for 12/20/23 @ 0200.    Pharmacy will manage vancomycin therapy, monitor serum vancomycin levels, monitor renal function and adjust regimen as necessary.      Thank you for allowing us to participate in this patient's care.     Lily Ayala 12/18/2023 8:47 PM  Department of Pharmacy  Ext [phone]

## 2023-12-19 NOTE — PROGRESS NOTES
[facility] Medicine  Progress Note    Patient Name: Divya Meek  MRN: [id number]  Patient Class: IP- Inpatient   Admission Date: 12/18/2023  Length of Stay: 1 days  Attending Physician: Alexi Iniguez MD  Primary Care Provider: Janet, Primary Doctor        Subjective:     Principal Problem:Diabetic ulcer of ankle        HPI:  [age] year female who resides in the group home, diabetes, noncompliance, she was not taking any diabetic medications for 2 years, left below-knee amputation came to hospital with posterior aspect of the heel ulcer and infection and cellulitis .  Reported ulcer to her right ankle that has begun 2 months ago and she had a fall about 1 month ago and got worse and draining .  Pain is worsening and feeling pain at the calf and came to ER .  She had left leg BKA in sept 2022. She was not taking DM medications more than 1 year ago. She did BKA in [facility] . She said she was on insulin during hospital stay   PMH as above   PSH . Thymus surgery ? For GBS , laparotomy for abdominal abscess and C section   Family history not significant        Overview/Hospital Course:  IR US guided aspiration ordered  Patient is upset she was NPO and wants to eat and sign out AMA. Explained risk even death to her and she hold back. But she can sign out AMA again . D/w pt and explained with RN and charge nurse     Interval History:     Review of Systems  Objective:     Vital Signs (Most Recent):  Temp: 97.7 °F (36.5 °C) (12/19/23 0734)  Pulse: 74 (12/19/23 0734)  Resp: 18 (12/19/23 0734)  BP: (!) 159/72 (12/19/23 0734)  SpO2: 98 % (12/19/23 0734) Vital Signs (24h Range):  Temp:  [37.4 °F (3 °C)-100.4 °F (38 °C)] 97.7 °F (36.5 °C)  Pulse:  [74-84] 74  Resp:  [10-18] 18  SpO2:  [95 %-100 %] 98 %  BP: (143-195)/(64-87) 159/72     Weight: 84.7 kg (186 lb 11.7 oz)  Body mass index is 31.07 kg/m².    Intake/Output Summary (Last 24 hours) at 12/19/2023 1107  Last data filed at 12/19/2023 0553  Gross  "per 24 hour   Intake 930 ml   Output 700 ml   Net 230 ml         Physical Exam  Vitals and nursing note reviewed.   HENT:      Head: Normocephalic and atraumatic.   Eyes:      Conjunctiva/sclera: Conjunctivae normal.   Neck:      Vascular: No JVD.   Cardiovascular:      Heart sounds: Normal heart sounds.   Pulmonary:      Effort: Pulmonary effort is normal.      Breath sounds: Normal breath sounds.   Abdominal:      Palpations: Abdomen is soft.   Musculoskeletal:         General: Normal range of motion.   Skin:     General: Skin is warm.   Neurological:      Mental Status: She is alert and oriented to person, place, and time.   Psychiatric:         Mood and Affect: Mood normal.             Significant Labs: All pertinent labs within the past 24 hours have been reviewed.  CBC:   Recent Labs   Lab 12/18/23  1355   WBC 15.44*   HGB 12.1   HCT 37.0   *     CMP:   Recent Labs   Lab 12/18/23  1355   *   K 3.6   CL 98   CO2 24   *   BUN 10   CREATININE 0.4*   CALCIUM 8.7   PROT 7.0   ALBUMIN 3.1*   BILITOT 0.4   ALKPHOS 121   AST 31   ALT 30   ANIONGAP 13       Significant Imaging: I have reviewed all pertinent imaging results/findings within the past 24 hours.    Assessment/Plan:      * Diabetic ulcer of ankle  Patient's FSGs are uncontrolled due to hyperglycemia on current medication regimen.  Last A1c reviewed-   Lab Results   Component Value Date    HGBA1C 11.9 (H) 12/18/2023     Most recent fingerstick glucose reviewed- No results for input(s): "POCTGLUCOSE" in the last 24 hours.  Current correctional scale  High  Increase anti-hyperglycemic dose as follows-   Antihyperglycemics (From admission, onward)      Start     Stop Route Frequency Ordered    12/18/23 1647  insulin aspart U-100 pen 0-10 Units         -- SubQ Before meals & nightly PRN 12/18/23 1547          Hold Oral hypoglycemics while patient is in the hospital.  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

## 2023-12-19 NOTE — CONSULTS
"[facility] - Emergency Dept  Vascular Surgery  Consult Note    Inpatient consult to Vascular Surgery  Consult performed by: Gabino White MD  Consult ordered by: Alexi Iniguez MD        Subjective:     Chief Complaint/Reason for Admission: Right calf pain     History of Present Illness: This a 68 year old  female admitted through the emergency room with complaints of increasing pain in the right calf. She has had a 1.5 cm ulceration behind the right calf for about a month. She was a long time smoker until about 5 months ago when she changed to "vaping". She has had a left BKA in 2022 at [facility]. She initially had a foot amputation which was later converted to a BKA. The patient may have been on insulin at one time, but denies diabetes. She denies any history of heart disease.       Review of patient's allergies indicates:  No Known Allergies    History reviewed. No pertinent surgical history.  Family History    None       Tobacco Use    Smoking status: Not on file    Smokeless tobacco: Not on file   Substance and Sexual Activity    Alcohol use: Not on file    Drug use: Not Currently    Sexual activity: Not Currently     Review of Systems  Objective:     Vital Signs (Most Recent):  Temp: (!) 37.4 °F (3 °C) (12/18/23 1814)  Pulse: 81 (12/18/23 1814)  Resp: 10 (12/18/23 1635)  BP: (!) 195/87 (12/18/23 1517)  SpO2: 99 % (12/18/23 1552) Vital Signs (24h Range):  Temp:  [37.4 °F (3 °C)-99.1 °F (37.3 °C)] 37.4 °F (3 °C)  Pulse:  [81-84] 81  Resp:  [10-18] 10  SpO2:  [98 %-100 %] 99 %  BP: (146-195)/(64-87) 195/87     Weight: 86.2 kg (190 lb)  Body mass index is 31.62 kg/m².        Physical Exam:  Awake and alert. Grumpy and easily agitated  PERRLA, EOM's full, mucous membranes moist  Left carotid bruit, neck non-tender  Chest: Lungs clear to auscultation. Heart RR without murmurs  Abdomen: Soft and non-tender with normal bowel sounds  Extremities: Left BKA, Tender right calf, capillary refill, " tender inflammed 12 mm ulcer over the Achilles tendon, Strong Doppler pulse right DP      Significant Labs:  BMP:   Recent Labs   Lab 12/18/23  1355   *   *   K 3.6   CL 98   CO2 24   BUN 10   CREATININE 0.4*   CALCIUM 8.7   MG 1.6     CBC:   Recent Labs   Lab 12/18/23  1355   WBC 15.44*   RBC 4.37   HGB 12.1   HCT 37.0   *   MCV 85   MCH 27.7   MCHC 32.7       Significant Diagnostics:  CT scan of the calf shows a posterior calf abscess confirmed by ultrasound.  No evidence of DVT/    Assessment/Plan:     Assessment:  The patient does not have compartment syndrome  The patient does have an abscess in the right calf  The patient probably has peripheral arterial occlusive disease    Recommendations:  Arterial duplex scan  Consult general surgery or radiology to drain the abscess and obtain cultures  The patient needs antibiotics which have bee started  The patient needs a carotid duplex scan to assess the left carotid bruit    Vascular Surgery team will follow    Gabino White MD FACS, Vascular Surgery  Active Diagnoses:    Diagnosis Date Noted POA    PRINCIPAL PROBLEM:  Diabetic ulcer of ankle [E11.622, L97.309] 12/18/2023 Unknown    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 I will follow-up with patient. Please contact us if you have any additional questions.    Gabino White MD  Vascular Surgery  [facility] - Emergency Dept

## 2023-12-19 NOTE — CONSULTS
Right achilles, red drainage, undressed.  John here to place IV line.  Wants pain med prior to dressing.  Will see after IV placed.

## 2023-12-19 NOTE — ASSESSMENT & PLAN NOTE
"Patient's FSGs are uncontrolled due to hyperglycemia on current medication regimen.  Last A1c reviewed-   Lab Results   Component Value Date    HGBA1C 11.9 (H) 12/18/2023     Most recent fingerstick glucose reviewed- No results for input(s): "POCTGLUCOSE" in the last 24 hours.  Current correctional scale  High  Increase anti-hyperglycemic dose as follows-   Antihyperglycemics (From admission, onward)      Start     Stop Route Frequency Ordered    12/18/23 1647  insulin aspart U-100 pen 0-10 Units         -- SubQ Before meals & nightly PRN 12/18/23 1547          Hold Oral hypoglycemics while patient is in the hospital.  A1c and SSI   May need to start insulin   CT noted with possible abscess and IR aspiration US guided ordered   NPO and can feed if needed    "

## 2023-12-19 NOTE — CONSULTS
[facility] - Emergency Dept  General Surgery  Consult Note    Inpatient consult to General Surgery  Consult performed by: Juan Carlos Meyers MD  Consult ordered by: Alexi Iniguez MD        Subjective:     Chief Complaint/Reason for Admission:  Right calf pain for 2 months    History of Present Illness:  This 68 year old female with right calf pain for over a month.  An area near her achilles ulcerated and started draining.  Pt is a chronic heavy smoker.  Already had a left BKA     No current facility-administered medications on file prior to encounter.     No current outpatient medications on file prior to encounter.       Review of patient's allergies indicates:  No Known Allergies    History reviewed. No pertinent past medical history.  History reviewed. No pertinent surgical history.  Family History    None       Tobacco Use    Smoking status: Not on file    Smokeless tobacco: Not on file   Substance and Sexual Activity    Alcohol use: Not on file    Drug use: Not Currently    Sexual activity: Not Currently     Review of Systems   Constitutional:  Negative for fever.   HENT: Negative.     Eyes: Negative.    Respiratory: Negative.     Cardiovascular: Negative.    Gastrointestinal: Negative.    Endocrine: Negative.    Genitourinary: Negative.    Musculoskeletal:  Positive for myalgias.   Skin:  Positive for wound.   Allergic/Immunologic: Negative.    Neurological: Negative.    Hematological: Negative.    Psychiatric/Behavioral: Negative.       Objective:     Vital Signs (Most Recent):  Temp: 99.3 °F (37.4 °C) (12/18/23 1913)  Pulse: 81 (12/18/23 1814)  Resp: 10 (12/18/23 1635)  BP: (!) 143/82 (12/18/23 1639)  SpO2: 97 % (12/18/23 1639) Vital Signs (24h Range):  Temp:  [37.4 °F (3 °C)-99.3 °F (37.4 °C)] 99.3 °F (37.4 °C)  Pulse:  [74-84] 81  Resp:  [10-18] 10  SpO2:  [97 %-100 %] 97 %  BP: (143-195)/(64-87) 143/82     Weight: 86.2 kg (190 lb)  Body mass index is 31.62 kg/m².    No intake or output data  "in the 24 hours ending 12/18/23 1915    Physical Exam  Constitutional:       General: She is not in acute distress.     Appearance: Normal appearance. She is not ill-appearing, toxic-appearing or diaphoretic.   HENT:      Head: Normocephalic.      Nose: Nose normal.   Eyes:      Conjunctiva/sclera: Conjunctivae normal.   Cardiovascular:      Rate and Rhythm: Normal rate and regular rhythm.   Pulmonary:      Effort: Pulmonary effort is normal.   Abdominal:      Palpations: Abdomen is soft.   Musculoskeletal:         General: Normal range of motion.      Cervical back: Normal range of motion.   Skin:     General: Skin is warm.      Comments: Skin over the calf has edema.  No significant erythema  over the calf.  Small ulcer neat the achilles.   Neurological:      General: No focal deficit present.      Mental Status: She is alert.   Psychiatric:         Mood and Affect: Mood normal.         Significant Labs:  CBC:   Recent Labs   Lab 12/18/23  1355   WBC 15.44*   RBC 4.37   HGB 12.1   HCT 37.0   *   MCV 85   MCH 27.7   MCHC 32.7     CMP:   Recent Labs   Lab 12/18/23  1355   *   CALCIUM 8.7   ALBUMIN 3.1*   PROT 7.0   *   K 3.6   CO2 24   CL 98   BUN 10   CREATININE 0.4*   ALKPHOS 121   ALT 30   AST 31   BILITOT 0.4     Coagulation: No results for input(s): "PT", "INR", "APTT" in the last 48 hours.  Lactic Acid:   Recent Labs   Lab 12/18/23  1355   LACTATE 1.6       Significant Diagnostics:  Imaging has been reviewed.  Possible hematoma vs abscess adjacent to the gastroc.     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    No evidence of compartment " syndrome.  May have jennifer abscess vs hematoma extending in the calf although presentation timing sounds more like a hematoma.    Given patients medical conditions, recommend US guided aspiration/drain placement to help guide abx therapy as opening the calf fascia will likely lead to wound complications      Thank you for your consult. Please call with questions    Juan Carlos Meyers MD  General Surgery  [facility] - Emergency Dept

## 2023-12-19 NOTE — PROGRESS NOTES
Patient seen and examined.  Was refusing all care this morning.  She initially signed AMA paperwork but then would not leave.      Vitals are stable   Afebrile   Calf is .  Possible increasing erythema adjacent to her Achilles wound    Labs reviewed, stable.  Mild leukocytosis     Imaging was rereviewed.  Initially placed a ultrasound guided aspiration order to determine if the gastroc fluid collection is an abscess or hematoma.  I and D of the calf would likely lead to a much larger wound that will likely not heal.  This may ultimately result in additional surgery and or an amputation of the limb given her chronic peripheral arterial disease related to smoking.  Case was discussed with Radiology.  Will re-attempt aspiration/drainage tomorrow.  NPO at midnight.

## 2023-12-19 NOTE — NURSING
Nurses Note -- 4 Eyes      12/18/2023   11:26 PM      Skin assessed during: Admit      [] No Altered Skin Integrity Present    []Prevention Measures Documented      [x] Yes- Altered Skin Integrity Present or Discovered   [x] LDA Added if Not in Epic (Describe Wound)   [x] New Altered Skin Integrity was Present on Admit and Documented in LDA   [x] Wound Image Taken    Wound Care Consulted? Yes    Attending Nurse:  Juhi Duenas RN/Staff Member:  boni

## 2023-12-19 NOTE — PROCEDURES
18 Gx 10cm PowerGlide Midline placed to pts LEFT basilic vein with the use of ultrasound guidance.    Ultrasound guidance: yes  Vessel Caliber: large and patent, compressibility normal  Needle advanced into vessel with real time Ultrasound guidance.  Guidewire confirmed in vessel.  Sterile sheath used.  Sterile dressing applied  Dressing dated   Education provided to patient re: proper maintenance of line- pt verbalized understanding  Limb alert applied.

## 2023-12-19 NOTE — ASSESSMENT & PLAN NOTE
"Patient's FSGs are uncontrolled due to hyperglycemia on current medication regimen.  Last A1c reviewed-   Lab Results   Component Value Date    HGBA1C 11.9 (H) 12/18/2023     Most recent fingerstick glucose reviewed- No results for input(s): "POCTGLUCOSE" in the last 24 hours.  Current correctional scale  Medium  Increase anti-hyperglycemic dose as follows-   Antihyperglycemics (From admission, onward)      Start     Stop Route Frequency Ordered    12/18/23 1647  insulin aspart U-100 pen 0-10 Units         -- SubQ Before meals & nightly PRN 12/18/23 1547          Hold Oral hypoglycemics while patient is in the hospital.  "

## 2023-12-19 NOTE — HOSPITAL COURSE
"IR US guided aspiration ordered  Patient is upset she was NPO and wants to eat and sign out AMA. Explained risk even death to her and she hold back. But she can sign out AMA again . D/w pt and explained with RN and charge nurse     12/20  Admitted with ankle wound . But found to have very hard and swollen calf . DVT study neg. CT with possible abscess .   Today . She agree to do IR US aspiration . She is not very able to pay attention about  her sickness. She signed AMA but later stayed after discussion     12/21  Assumed care. Chart reviewed. Labs reviewed and AM labs ordered. For I & D today. No new complaints.     12/22 Discussed with ID: [email or web address] until wound granulates, then can change to cefalexin. Has been accepted to LTAC for iv antibiotics and wound care. Wound growing Proteus and beta hemolytic. Patient feels "Good" and wants to be discharged to facility.  VSS  Lungs: no adventitious  Heart S1S2 reg  Abdo: soft  Right leg, calf wound dressed and dry.  Imp s/p abscess I&D, stable for transfer to LTAC  Plan discharge on regimen as listed below in discharge med rec  "

## 2023-12-19 NOTE — CONSULTS
Right achilles ulcer 1.4x2.5cm unable to determine depth, necrotic odorous wound yellow drainage painful, red angry periwound.  Cleaned and dried and covered with aquacel ag.  Will contact Dr Iniguez regarding orders.

## 2023-12-20 LAB
BASOPHILS # BLD AUTO: 0.05 K/UL (ref 0–0.2)
BASOPHILS NFR BLD: 0.4 % (ref 0–1.9)
DIFFERENTIAL METHOD BLD: ABNORMAL
EOSINOPHIL # BLD AUTO: 0 K/UL (ref 0–0.5)
EOSINOPHIL NFR BLD: 0 % (ref 0–8)
ERYTHROCYTE [DISTWIDTH] IN BLOOD BY AUTOMATED COUNT: 12.5 % (ref 11.5–14.5)
GLUCOSE SERPL-MCNC: 236 MG/DL (ref 70–110)
GLUCOSE SERPL-MCNC: 248 MG/DL (ref 70–110)
GLUCOSE SERPL-MCNC: 266 MG/DL (ref 70–110)
GLUCOSE SERPL-MCNC: 294 MG/DL (ref 70–110)
HCT VFR BLD AUTO: 36 % (ref 37–48.5)
HGB BLD-MCNC: 11.8 G/DL (ref 12–16)
IMM GRANULOCYTES # BLD AUTO: 0.12 K/UL (ref 0–0.04)
IMM GRANULOCYTES NFR BLD AUTO: 0.9 % (ref 0–0.5)
LYMPHOCYTES # BLD AUTO: 1.9 K/UL (ref 1–4.8)
LYMPHOCYTES NFR BLD: 13.8 % (ref 18–48)
MCH RBC QN AUTO: 28 PG (ref 27–31)
MCHC RBC AUTO-ENTMCNC: 32.8 G/DL (ref 32–36)
MCV RBC AUTO: 86 FL (ref 82–98)
MONOCYTES # BLD AUTO: 0.9 K/UL (ref 0.3–1)
MONOCYTES NFR BLD: 6.7 % (ref 4–15)
NEUTROPHILS # BLD AUTO: 11 K/UL (ref 1.8–7.7)
NEUTROPHILS NFR BLD: 78.2 % (ref 38–73)
NRBC BLD-RTO: 0 /100 WBC
PLATELET # BLD AUTO: 488 K/UL (ref 150–450)
PMV BLD AUTO: 9.2 FL (ref 9.2–12.9)
RBC # BLD AUTO: 4.21 M/UL (ref 4–5.4)
VANCOMYCIN TROUGH SERPL-MCNC: 12.6 UG/ML
VANCOMYCIN TROUGH SERPL-MCNC: 18.5 UG/ML
VANCOMYCIN TROUGH SERPL-MCNC: 41.2 UG/ML
WBC # BLD AUTO: 14.02 K/UL (ref 3.9–12.7)

## 2023-12-20 PROCEDURE — 12000002 HC ACUTE/MED SURGE SEMI-PRIVATE ROOM

## 2023-12-20 PROCEDURE — 25000003 PHARM REV CODE 250: Performed by: RADIOLOGY

## 2023-12-20 PROCEDURE — 85025 COMPLETE CBC W/AUTO DIFF WBC: CPT | Performed by: INTERNAL MEDICINE

## 2023-12-20 PROCEDURE — 87186 SC STD MICRODIL/AGAR DIL: CPT | Performed by: INTERNAL MEDICINE

## 2023-12-20 PROCEDURE — 25000003 PHARM REV CODE 250: Performed by: INTERNAL MEDICINE

## 2023-12-20 PROCEDURE — 99222 1ST HOSP IP/OBS MODERATE 55: CPT | Mod: ,,, | Performed by: FAMILY MEDICINE

## 2023-12-20 PROCEDURE — 80202 ASSAY OF VANCOMYCIN: CPT | Performed by: INTERNAL MEDICINE

## 2023-12-20 PROCEDURE — 87075 CULTR BACTERIA EXCEPT BLOOD: CPT | Performed by: INTERNAL MEDICINE

## 2023-12-20 PROCEDURE — 87077 CULTURE AEROBIC IDENTIFY: CPT | Performed by: INTERNAL MEDICINE

## 2023-12-20 PROCEDURE — 36415 COLL VENOUS BLD VENIPUNCTURE: CPT | Performed by: INTERNAL MEDICINE

## 2023-12-20 PROCEDURE — 63600175 PHARM REV CODE 636 W HCPCS: Performed by: INTERNAL MEDICINE

## 2023-12-20 PROCEDURE — 87147 CULTURE TYPE IMMUNOLOGIC: CPT | Performed by: INTERNAL MEDICINE

## 2023-12-20 PROCEDURE — 87070 CULTURE OTHR SPECIMN AEROBIC: CPT | Performed by: INTERNAL MEDICINE

## 2023-12-20 PROCEDURE — 99232 SBSQ HOSP IP/OBS MODERATE 35: CPT | Mod: ,,, | Performed by: STUDENT IN AN ORGANIZED HEALTH CARE EDUCATION/TRAINING PROGRAM

## 2023-12-20 RX ORDER — LIDOCAINE HYDROCHLORIDE 10 MG/ML
INJECTION, SOLUTION EPIDURAL; INFILTRATION; INTRACAUDAL; PERINEURAL
Status: COMPLETED | OUTPATIENT
Start: 2023-12-20 | End: 2023-12-20

## 2023-12-20 RX ADMIN — VANCOMYCIN HYDROCHLORIDE 1500 MG: 1.5 INJECTION, POWDER, LYOPHILIZED, FOR SOLUTION INTRAVENOUS at 12:12

## 2023-12-20 RX ADMIN — INSULIN ASPART 6 UNITS: 100 INJECTION, SOLUTION INTRAVENOUS; SUBCUTANEOUS at 08:12

## 2023-12-20 RX ADMIN — VANCOMYCIN HYDROCHLORIDE 1500 MG: 1.5 INJECTION, POWDER, LYOPHILIZED, FOR SOLUTION INTRAVENOUS at 10:12

## 2023-12-20 RX ADMIN — LIDOCAINE HYDROCHLORIDE 5 ML: 10 INJECTION, SOLUTION EPIDURAL; INFILTRATION; INTRACAUDAL; PERINEURAL at 09:12

## 2023-12-20 RX ADMIN — HYDROCODONE BITARTRATE AND ACETAMINOPHEN 1 TABLET: 5; 325 TABLET ORAL at 06:12

## 2023-12-20 RX ADMIN — HYDROMORPHONE HYDROCHLORIDE 1 MG: 1 INJECTION, SOLUTION INTRAMUSCULAR; INTRAVENOUS; SUBCUTANEOUS at 12:12

## 2023-12-20 RX ADMIN — PIPERACILLIN SODIUM AND TAZOBACTAM SODIUM 3.38 G: 3; .375 INJECTION, POWDER, LYOPHILIZED, FOR SOLUTION INTRAVENOUS at 10:12

## 2023-12-20 RX ADMIN — HYDROMORPHONE HYDROCHLORIDE 1 MG: 1 INJECTION, SOLUTION INTRAMUSCULAR; INTRAVENOUS; SUBCUTANEOUS at 05:12

## 2023-12-20 RX ADMIN — COLLAGENASE SANTYL: 250 OINTMENT TOPICAL at 11:12

## 2023-12-20 RX ADMIN — ENOXAPARIN SODIUM 40 MG: 40 INJECTION SUBCUTANEOUS at 04:12

## 2023-12-20 RX ADMIN — PIPERACILLIN SODIUM AND TAZOBACTAM SODIUM 3.38 G: 3; .375 INJECTION, POWDER, LYOPHILIZED, FOR SOLUTION INTRAVENOUS at 05:12

## 2023-12-20 RX ADMIN — INSULIN ASPART 4 UNITS: 100 INJECTION, SOLUTION INTRAVENOUS; SUBCUTANEOUS at 11:12

## 2023-12-20 RX ADMIN — INSULIN ASPART 6 UNITS: 100 INJECTION, SOLUTION INTRAVENOUS; SUBCUTANEOUS at 04:12

## 2023-12-20 RX ADMIN — PIPERACILLIN SODIUM AND TAZOBACTAM SODIUM 3.38 G: 3; .375 INJECTION, POWDER, LYOPHILIZED, FOR SOLUTION INTRAVENOUS at 03:12

## 2023-12-20 RX ADMIN — VANCOMYCIN HYDROCHLORIDE 1500 MG: 1.5 INJECTION, POWDER, LYOPHILIZED, FOR SOLUTION INTRAVENOUS at 03:12

## 2023-12-20 RX ADMIN — INSULIN ASPART 3 UNITS: 100 INJECTION, SOLUTION INTRAVENOUS; SUBCUTANEOUS at 09:12

## 2023-12-20 NOTE — PROGRESS NOTES
Patient underwent ultrasound-guided aspiration today with thick purulent fluid drained.  It was estimated that only 5-10% of the fluid was evacuated secondary to loculations.  Patient is still complaining of calf  Pain.      Vitals are stable     Labs reviewed     Overall, I discussed continued observation with IV antibiotics versus proceeding to the OR for incision and drainage now that we know the fluid collection along the gastroc is an abscess.  Patient is amenable to attempted I and D to help facilitate pain control and faster DC.  We did discuss the possibility of a chronic wound given her PAD.  Plan for operative intervention tomorrow.  NPO at midnight

## 2023-12-20 NOTE — PROGRESS NOTES
[facility] Medicine  Progress Note    Patient Name: Divya Meek  MRN: [id number]  Patient Class: IP- Inpatient   Admission Date: 12/18/2023  Length of Stay: 2 days  Attending Physician: Alexi Iniguez MD  Primary Care Provider: Janet, Primary Doctor        Subjective:     Principal Problem:Diabetic ulcer of ankle        HPI:  [age] year female who resides in the group home, diabetes, noncompliance, she was not taking any diabetic medications for 2 years, left below-knee amputation came to hospital with posterior aspect of the heel ulcer and infection and cellulitis .  Reported ulcer to her right ankle that has begun 2 months ago and she had a fall about 1 month ago and got worse and draining .  Pain is worsening and feeling pain at the calf and came to ER .  She had left leg BKA in sept 2022. She was not taking DM medications more than 1 year ago. She did BKA in [facility] . She said she was on insulin during hospital stay   PMH as above   PSH . Thymus surgery ? For GBS , laparotomy for abdominal abscess and C section   Family history not significant        Overview/Hospital Course:  IR US guided aspiration ordered  Patient is upset she was NPO and wants to eat and sign out AMA. Explained risk even death to her and she hold back. But she can sign out AMA again . D/w pt and explained with RN and charge nurse     12/20  Admitted with ankle wound . But found to have very hard and swollen calf . DVT study neg. CT with possible abscess .   Today . She agree to do IR US aspiration . She is not very able to pay attention about  her sickness. She signed AMA but later stayed after discussion     Interval History:     Review of Systems  Objective:     Vital Signs (Most Recent):  Temp: 98 °F (36.7 °C) (12/20/23 0727)  Pulse: 72 (12/20/23 0727)  Resp: 18 (12/20/23 0727)  BP: (!) 162/72 (notified nurse q) (12/20/23 0727)  SpO2: 97 % (12/20/23 0727) Vital Signs (24h Range):  Temp:  [97.7 °F (36.5 °C)-98.1  "°F (36.7 °C)] 98 °F (36.7 °C)  Pulse:  [72-84] 72  Resp:  [16-18] 18  SpO2:  [94 %-98 %] 97 %  BP: (133-162)/(67-81) 162/72     Weight: 84.7 kg (186 lb 11.7 oz)  Body mass index is 31.07 kg/m².    Intake/Output Summary (Last 24 hours) at 12/20/2023 1004  Last data filed at 12/20/2023 0600  Gross per 24 hour   Intake 930 ml   Output 750 ml   Net 180 ml         Physical Exam  Vitals and nursing note reviewed.   HENT:      Head: Normocephalic and atraumatic.   Eyes:      Conjunctiva/sclera: Conjunctivae normal.   Neck:      Vascular: No JVD.   Cardiovascular:      Rate and Rhythm: Normal rate.      Heart sounds: Normal heart sounds.   Pulmonary:      Effort: Pulmonary effort is normal.   Abdominal:      Palpations: Abdomen is soft.   Musculoskeletal:         General: Normal range of motion.   Skin:     General: Skin is warm.   Neurological:      Mental Status: She is alert and oriented to person, place, and time.   Psychiatric:         Mood and Affect: Mood normal.             Significant Labs: All pertinent labs within the past 24 hours have been reviewed.  CMP:   Recent Labs   Lab 12/18/23  1355   *   K 3.6   CL 98   CO2 24   *   BUN 10   CREATININE 0.4*   CALCIUM 8.7   PROT 7.0   ALBUMIN 3.1*   BILITOT 0.4   ALKPHOS 121   AST 31   ALT 30   ANIONGAP 13     Cardiac Markers: No results for input(s): "CKMB", "MYOGLOBIN", "BNP", "TROPISTAT" in the last 48 hours.  Coagulation: No results for input(s): "PT", "INR", "APTT" in the last 48 hours.    Significant Imaging: I have reviewed all pertinent imaging results/findings within the past 24 hours.    Assessment/Plan:      * Diabetic ulcer of ankle       CT scan of right calf with Elliptical soft tissue fluid collection deep to the gastrocnemius muscle which may represent liquefied hematoma or abscess   Patient's FSGs are uncontrolled due to hyperglycemia on current medication regimen  Last A1c reviewed-        Lab Results   Component Value Date     HGBA1C 11.9 " "(H) 12/18/2023      Most recent fingerstick glucose reviewed- No results for input(s): "POCTGLUCOSE" in the last 24 hours.  Current correctional scale  High  Increase anti-hyperglycemic dose as follows-   Antihyperglycemics (From admission, onward)        Start     Stop Route Frequency Ordered     12/18/23 1647   insulin aspart U-100 pen 0-10 Units         -- SubQ Before meals & nightly PRN 12/18/23 1547             Hold Oral hypoglycemics while patient is in the hospital.  A1c and SSI   May need to start insulin   CT noted with possible abscess and IR aspiration US guided today   NPO and can feed if needed  Patient may sign AMA anytime . 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  A1c 11.9  SSI      VTE Risk Mitigation (From admission, onward)           Ordered     enoxaparin injection 40 mg  Daily         12/18/23 1501     IP VTE HIGH RISK PATIENT  Once         12/18/23 1501     Place sequential compression device  Until discontinued         12/18/23 1501                    Discharge Planning   SHONNA: 12/25/2023     Code Status: Full Code   Is the patient medically ready for discharge?:     Reason for patient still in hospital (select all that apply): Treatment  Discharge Plan A: Group Home                  Alexi Iniguez MD  Department of Hospital Medicine   [facility]    "

## 2023-12-20 NOTE — PLAN OF CARE
Us guided right calf aspiration completed.  Pt rasheeda well.  Approx 2ml of thick purulent drainage aspirated.  Band aid to site.  No bleeding noted.  Pt transported to room 1223 in hospital bed.  Phone report called to pt's nurse, Carmelo.

## 2023-12-20 NOTE — PROGRESS NOTES
Pharmacokinetic Assessment Follow Up: IV Vancomycin    Patient brief summary:  Divya Meek is a 68 y.o. female initiated on antimicrobial therapy with IV Vancomycin for treatment of Bone/Joint infection    The patient's current regimen is Vancomycin 1500 mg every 10 hours.       Actual Body Weight = 84.7 kg (186 lb 11.7 oz).    Renal Function:   Estimated Creatinine Clearance: 142.7 mL/min (A) (based on SCr of 0.4 mg/dL (L)).      Vancomycin serum concentration assessment(s):    The trough level was drawn correctly and can be used to guide therapy at this time. The measurement is below the desired definitive target range of 15 to 20 mcg/mL.    Vancomycin Regimen Plan:    Change regimen to Vancomycin 1500 mg IV every 10 hours with next serum trough concentration measured at 08:00 prior to 7th dose on 12/21.    Drug levels (last 3 results):  Recent Labs   Lab Result Units 12/20/23  0129   Vancomycin-Trough ug/mL 12.6       Pharmacy will continue to follow and monitor vancomycin.    Please contact pharmacy at extension [phone] for questions regarding this assessment.    Thank you for the consult,   Kaleigh Shipman

## 2023-12-21 ENCOUNTER — ANESTHESIA (OUTPATIENT)
Dept: SURGERY | Facility: HOSPITAL | Age: 68
DRG: 988 | End: 2023-12-21
Payer: MEDICARE

## 2023-12-21 ENCOUNTER — ANESTHESIA EVENT (OUTPATIENT)
Dept: SURGERY | Facility: HOSPITAL | Age: 68
DRG: 988 | End: 2023-12-21
Payer: MEDICARE

## 2023-12-21 LAB
BASOPHILS # BLD AUTO: 0.04 K/UL (ref 0–0.2)
BASOPHILS NFR BLD: 0.3 % (ref 0–1.9)
DIFFERENTIAL METHOD BLD: ABNORMAL
EOSINOPHIL # BLD AUTO: 0 K/UL (ref 0–0.5)
EOSINOPHIL NFR BLD: 0 % (ref 0–8)
ERYTHROCYTE [DISTWIDTH] IN BLOOD BY AUTOMATED COUNT: 12.5 % (ref 11.5–14.5)
GLUCOSE SERPL-MCNC: 231 MG/DL (ref 70–110)
GLUCOSE SERPL-MCNC: 242 MG/DL (ref 70–110)
GLUCOSE SERPL-MCNC: 251 MG/DL (ref 70–110)
GLUCOSE SERPL-MCNC: 258 MG/DL (ref 70–110)
GLUCOSE SERPL-MCNC: 314 MG/DL (ref 70–110)
HCT VFR BLD AUTO: 33.5 % (ref 37–48.5)
HGB BLD-MCNC: 11.1 G/DL (ref 12–16)
IMM GRANULOCYTES # BLD AUTO: 0.13 K/UL (ref 0–0.04)
IMM GRANULOCYTES NFR BLD AUTO: 1 % (ref 0–0.5)
LYMPHOCYTES # BLD AUTO: 1.7 K/UL (ref 1–4.8)
LYMPHOCYTES NFR BLD: 12.4 % (ref 18–48)
MCH RBC QN AUTO: 28 PG (ref 27–31)
MCHC RBC AUTO-ENTMCNC: 33.1 G/DL (ref 32–36)
MCV RBC AUTO: 84 FL (ref 82–98)
MONOCYTES # BLD AUTO: 1 K/UL (ref 0.3–1)
MONOCYTES NFR BLD: 7.1 % (ref 4–15)
NEUTROPHILS # BLD AUTO: 10.8 K/UL (ref 1.8–7.7)
NEUTROPHILS NFR BLD: 79.2 % (ref 38–73)
NRBC BLD-RTO: 0 /100 WBC
PLATELET # BLD AUTO: 447 K/UL (ref 150–450)
PMV BLD AUTO: 9.4 FL (ref 9.2–12.9)
RBC # BLD AUTO: 3.97 M/UL (ref 4–5.4)
WBC # BLD AUTO: 13.6 K/UL (ref 3.9–12.7)

## 2023-12-21 PROCEDURE — 71000039 HC RECOVERY, EACH ADD'L HOUR: Performed by: STUDENT IN AN ORGANIZED HEALTH CARE EDUCATION/TRAINING PROGRAM

## 2023-12-21 PROCEDURE — 37000008 HC ANESTHESIA 1ST 15 MINUTES: Performed by: STUDENT IN AN ORGANIZED HEALTH CARE EDUCATION/TRAINING PROGRAM

## 2023-12-21 PROCEDURE — 63600175 PHARM REV CODE 636 W HCPCS: Performed by: INTERNAL MEDICINE

## 2023-12-21 PROCEDURE — 85025 COMPLETE CBC W/AUTO DIFF WBC: CPT | Performed by: INTERNAL MEDICINE

## 2023-12-21 PROCEDURE — 99900035 HC TECH TIME PER 15 MIN (STAT)

## 2023-12-21 PROCEDURE — 87116 MYCOBACTERIA CULTURE: CPT | Performed by: STUDENT IN AN ORGANIZED HEALTH CARE EDUCATION/TRAINING PROGRAM

## 2023-12-21 PROCEDURE — 0K9S0ZZ DRAINAGE OF RIGHT LOWER LEG MUSCLE, OPEN APPROACH: ICD-10-PCS | Performed by: STUDENT IN AN ORGANIZED HEALTH CARE EDUCATION/TRAINING PROGRAM

## 2023-12-21 PROCEDURE — 99231 SBSQ HOSP IP/OBS SF/LOW 25: CPT | Mod: ,,, | Performed by: FAMILY MEDICINE

## 2023-12-21 PROCEDURE — 12000002 HC ACUTE/MED SURGE SEMI-PRIVATE ROOM

## 2023-12-21 PROCEDURE — 37000009 HC ANESTHESIA EA ADD 15 MINS: Performed by: STUDENT IN AN ORGANIZED HEALTH CARE EDUCATION/TRAINING PROGRAM

## 2023-12-21 PROCEDURE — 25000003 PHARM REV CODE 250: Performed by: NURSE ANESTHETIST, CERTIFIED REGISTERED

## 2023-12-21 PROCEDURE — 36415 COLL VENOUS BLD VENIPUNCTURE: CPT | Performed by: INTERNAL MEDICINE

## 2023-12-21 PROCEDURE — 87070 CULTURE OTHR SPECIMN AEROBIC: CPT | Performed by: STUDENT IN AN ORGANIZED HEALTH CARE EDUCATION/TRAINING PROGRAM

## 2023-12-21 PROCEDURE — 63600175 PHARM REV CODE 636 W HCPCS: Performed by: NURSE ANESTHETIST, CERTIFIED REGISTERED

## 2023-12-21 PROCEDURE — 87102 FUNGUS ISOLATION CULTURE: CPT | Performed by: STUDENT IN AN ORGANIZED HEALTH CARE EDUCATION/TRAINING PROGRAM

## 2023-12-21 PROCEDURE — 71000033 HC RECOVERY, INTIAL HOUR: Performed by: STUDENT IN AN ORGANIZED HEALTH CARE EDUCATION/TRAINING PROGRAM

## 2023-12-21 PROCEDURE — 25000003 PHARM REV CODE 250: Performed by: INTERNAL MEDICINE

## 2023-12-21 PROCEDURE — 94799 UNLISTED PULMONARY SVC/PX: CPT

## 2023-12-21 PROCEDURE — 87075 CULTR BACTERIA EXCEPT BLOOD: CPT | Mod: 59 | Performed by: STUDENT IN AN ORGANIZED HEALTH CARE EDUCATION/TRAINING PROGRAM

## 2023-12-21 PROCEDURE — D9220A PRA ANESTHESIA: Mod: ,,, | Performed by: ANESTHESIOLOGY

## 2023-12-21 PROCEDURE — 36000704 HC OR TIME LEV I 1ST 15 MIN: Performed by: STUDENT IN AN ORGANIZED HEALTH CARE EDUCATION/TRAINING PROGRAM

## 2023-12-21 PROCEDURE — 25000003 PHARM REV CODE 250: Performed by: FAMILY MEDICINE

## 2023-12-21 PROCEDURE — 63600175 PHARM REV CODE 636 W HCPCS: Performed by: ANESTHESIOLOGY

## 2023-12-21 PROCEDURE — D9220A PRA ANESTHESIA: ICD-10-PCS | Mod: ,,, | Performed by: ANESTHESIOLOGY

## 2023-12-21 PROCEDURE — 87206 SMEAR FLUORESCENT/ACID STAI: CPT | Performed by: STUDENT IN AN ORGANIZED HEALTH CARE EDUCATION/TRAINING PROGRAM

## 2023-12-21 PROCEDURE — 10061 I&D ABSCESS COMP/MULTIPLE: CPT | Mod: ,,, | Performed by: STUDENT IN AN ORGANIZED HEALTH CARE EDUCATION/TRAINING PROGRAM

## 2023-12-21 PROCEDURE — 25000003 PHARM REV CODE 250: Performed by: ANESTHESIOLOGY

## 2023-12-21 PROCEDURE — 36000705 HC OR TIME LEV I EA ADD 15 MIN: Performed by: STUDENT IN AN ORGANIZED HEALTH CARE EDUCATION/TRAINING PROGRAM

## 2023-12-21 PROCEDURE — 87205 SMEAR GRAM STAIN: CPT | Performed by: STUDENT IN AN ORGANIZED HEALTH CARE EDUCATION/TRAINING PROGRAM

## 2023-12-21 RX ORDER — ONDANSETRON HYDROCHLORIDE 2 MG/ML
4 INJECTION, SOLUTION INTRAVENOUS DAILY PRN
Status: DISCONTINUED | OUTPATIENT
Start: 2023-12-21 | End: 2023-12-21 | Stop reason: HOSPADM

## 2023-12-21 RX ORDER — SODIUM CHLORIDE, SODIUM LACTATE, POTASSIUM CHLORIDE, CALCIUM CHLORIDE 600; 310; 30; 20 MG/100ML; MG/100ML; MG/100ML; MG/100ML
INJECTION, SOLUTION INTRAVENOUS CONTINUOUS PRN
Status: DISCONTINUED | OUTPATIENT
Start: 2023-12-21 | End: 2023-12-21

## 2023-12-21 RX ORDER — FENTANYL CITRATE 50 UG/ML
25 INJECTION, SOLUTION INTRAMUSCULAR; INTRAVENOUS EVERY 5 MIN PRN
Status: COMPLETED | OUTPATIENT
Start: 2023-12-21 | End: 2023-12-21

## 2023-12-21 RX ORDER — PROPOFOL 10 MG/ML
VIAL (ML) INTRAVENOUS
Status: DISCONTINUED | OUTPATIENT
Start: 2023-12-21 | End: 2023-12-21

## 2023-12-21 RX ORDER — LABETALOL HYDROCHLORIDE 5 MG/ML
5 INJECTION, SOLUTION INTRAVENOUS ONCE
Status: COMPLETED | OUTPATIENT
Start: 2023-12-21 | End: 2023-12-21

## 2023-12-21 RX ORDER — HYDROMORPHONE HYDROCHLORIDE 1 MG/ML
0.2 INJECTION, SOLUTION INTRAMUSCULAR; INTRAVENOUS; SUBCUTANEOUS EVERY 5 MIN PRN
Status: COMPLETED | OUTPATIENT
Start: 2023-12-21 | End: 2023-12-21

## 2023-12-21 RX ORDER — ONDANSETRON HYDROCHLORIDE 2 MG/ML
INJECTION, SOLUTION INTRAMUSCULAR; INTRAVENOUS
Status: DISCONTINUED | OUTPATIENT
Start: 2023-12-21 | End: 2023-12-21

## 2023-12-21 RX ORDER — ACETAMINOPHEN 10 MG/ML
INJECTION, SOLUTION INTRAVENOUS
Status: DISCONTINUED | OUTPATIENT
Start: 2023-12-21 | End: 2023-12-21

## 2023-12-21 RX ORDER — MIDAZOLAM HYDROCHLORIDE 1 MG/ML
INJECTION INTRAMUSCULAR; INTRAVENOUS
Status: DISCONTINUED | OUTPATIENT
Start: 2023-12-21 | End: 2023-12-21

## 2023-12-21 RX ORDER — ROCURONIUM BROMIDE 10 MG/ML
INJECTION, SOLUTION INTRAVENOUS
Status: DISCONTINUED | OUTPATIENT
Start: 2023-12-21 | End: 2023-12-21

## 2023-12-21 RX ORDER — OXYCODONE HYDROCHLORIDE 5 MG/1
5 TABLET ORAL
Status: DISCONTINUED | OUTPATIENT
Start: 2023-12-21 | End: 2023-12-21 | Stop reason: HOSPADM

## 2023-12-21 RX ORDER — FAMOTIDINE 10 MG/ML
INJECTION INTRAVENOUS
Status: DISCONTINUED | OUTPATIENT
Start: 2023-12-21 | End: 2023-12-21

## 2023-12-21 RX ORDER — FENTANYL CITRATE 50 UG/ML
INJECTION, SOLUTION INTRAMUSCULAR; INTRAVENOUS
Status: DISCONTINUED | OUTPATIENT
Start: 2023-12-21 | End: 2023-12-21

## 2023-12-21 RX ORDER — DIPHENHYDRAMINE HYDROCHLORIDE 50 MG/ML
12.5 INJECTION INTRAMUSCULAR; INTRAVENOUS
Status: DISCONTINUED | OUTPATIENT
Start: 2023-12-21 | End: 2023-12-21 | Stop reason: HOSPADM

## 2023-12-21 RX ORDER — EPHEDRINE SULFATE 50 MG/ML
INJECTION, SOLUTION INTRAVENOUS
Status: DISCONTINUED | OUTPATIENT
Start: 2023-12-21 | End: 2023-12-21

## 2023-12-21 RX ORDER — LIDOCAINE HYDROCHLORIDE 20 MG/ML
INJECTION, SOLUTION EPIDURAL; INFILTRATION; INTRACAUDAL; PERINEURAL
Status: DISCONTINUED | OUTPATIENT
Start: 2023-12-21 | End: 2023-12-21

## 2023-12-21 RX ADMIN — VANCOMYCIN HYDROCHLORIDE 1500 MG: 1.5 INJECTION, POWDER, LYOPHILIZED, FOR SOLUTION INTRAVENOUS at 09:12

## 2023-12-21 RX ADMIN — OXYCODONE HYDROCHLORIDE 5 MG: 5 TABLET ORAL at 02:12

## 2023-12-21 RX ADMIN — COLLAGENASE SANTYL: 250 OINTMENT TOPICAL at 09:12

## 2023-12-21 RX ADMIN — HYDROMORPHONE HYDROCHLORIDE 0.2 MG: 1 INJECTION, SOLUTION INTRAMUSCULAR; INTRAVENOUS; SUBCUTANEOUS at 02:12

## 2023-12-21 RX ADMIN — LABETALOL HYDROCHLORIDE 5 MG: 5 INJECTION, SOLUTION INTRAVENOUS at 03:12

## 2023-12-21 RX ADMIN — FENTANYL CITRATE 25 MCG: 50 INJECTION, SOLUTION INTRAMUSCULAR; INTRAVENOUS at 02:12

## 2023-12-21 RX ADMIN — SODIUM CHLORIDE, SODIUM LACTATE, POTASSIUM CHLORIDE, AND CALCIUM CHLORIDE: .6; .31; .03; .02 INJECTION, SOLUTION INTRAVENOUS at 12:12

## 2023-12-21 RX ADMIN — FENTANYL CITRATE 50 MCG: 50 INJECTION INTRAMUSCULAR; INTRAVENOUS at 01:12

## 2023-12-21 RX ADMIN — ONDANSETRON 4 MG: 2 INJECTION INTRAMUSCULAR; INTRAVENOUS at 02:12

## 2023-12-21 RX ADMIN — SUGAMMADEX 200 MG: 100 INJECTION, SOLUTION INTRAVENOUS at 02:12

## 2023-12-21 RX ADMIN — PIPERACILLIN SODIUM AND TAZOBACTAM SODIUM 3.38 G: 3; .375 INJECTION, POWDER, LYOPHILIZED, FOR SOLUTION INTRAVENOUS at 10:12

## 2023-12-21 RX ADMIN — HYDROMORPHONE HYDROCHLORIDE 1 MG: 1 INJECTION, SOLUTION INTRAMUSCULAR; INTRAVENOUS; SUBCUTANEOUS at 09:12

## 2023-12-21 RX ADMIN — PROPOFOL 140 MG: 10 INJECTION, EMULSION INTRAVENOUS at 01:12

## 2023-12-21 RX ADMIN — ACETAMINOPHEN 1000 MG: 10 INJECTION, SOLUTION INTRAVENOUS at 01:12

## 2023-12-21 RX ADMIN — INSULIN ASPART 6 UNITS: 100 INJECTION, SOLUTION INTRAVENOUS; SUBCUTANEOUS at 09:12

## 2023-12-21 RX ADMIN — HYDROCODONE BITARTRATE AND ACETAMINOPHEN 1 TABLET: 5; 325 TABLET ORAL at 06:12

## 2023-12-21 RX ADMIN — PIPERACILLIN SODIUM AND TAZOBACTAM SODIUM 3.38 G: 3; .375 INJECTION, POWDER, LYOPHILIZED, FOR SOLUTION INTRAVENOUS at 02:12

## 2023-12-21 RX ADMIN — FAMOTIDINE 20 MG: 10 INJECTION, SOLUTION INTRAVENOUS at 01:12

## 2023-12-21 RX ADMIN — EPHEDRINE SULFATE 10 MG: 50 INJECTION INTRAVENOUS at 01:12

## 2023-12-21 RX ADMIN — INSULIN ASPART 6 UNITS: 100 INJECTION, SOLUTION INTRAVENOUS; SUBCUTANEOUS at 11:12

## 2023-12-21 RX ADMIN — LIDOCAINE HYDROCHLORIDE 60 MG: 20 INJECTION, SOLUTION INTRAVENOUS at 01:12

## 2023-12-21 RX ADMIN — PIPERACILLIN SODIUM AND TAZOBACTAM SODIUM 3.38 G: 3; .375 INJECTION, POWDER, LYOPHILIZED, FOR SOLUTION INTRAVENOUS at 06:12

## 2023-12-21 RX ADMIN — HYDROCODONE BITARTRATE AND ACETAMINOPHEN 1 TABLET: 5; 325 TABLET ORAL at 07:12

## 2023-12-21 RX ADMIN — MIDAZOLAM HYDROCHLORIDE 2 MG: 1 INJECTION, SOLUTION INTRAMUSCULAR; INTRAVENOUS at 01:12

## 2023-12-21 RX ADMIN — INSULIN ASPART 4 UNITS: 100 INJECTION, SOLUTION INTRAVENOUS; SUBCUTANEOUS at 03:12

## 2023-12-21 RX ADMIN — ROCURONIUM BROMIDE 30 MG: 10 INJECTION, SOLUTION INTRAVENOUS at 01:12

## 2023-12-21 RX ADMIN — INSULIN ASPART 6 UNITS: 100 INJECTION, SOLUTION INTRAVENOUS; SUBCUTANEOUS at 06:12

## 2023-12-21 RX ADMIN — HYDROCODONE BITARTRATE AND ACETAMINOPHEN 1 TABLET: 5; 325 TABLET ORAL at 12:12

## 2023-12-21 RX ADMIN — HYDROMORPHONE HYDROCHLORIDE 0.2 MG: 1 INJECTION, SOLUTION INTRAMUSCULAR; INTRAVENOUS; SUBCUTANEOUS at 03:12

## 2023-12-21 RX ADMIN — HYDROMORPHONE HYDROCHLORIDE 1 MG: 1 INJECTION, SOLUTION INTRAMUSCULAR; INTRAVENOUS; SUBCUTANEOUS at 08:12

## 2023-12-21 NOTE — PROGRESS NOTES
[facility] Medicine  Progress Note    Patient Name: Divya Meek  MRN: [id number]  Patient Class: IP- Inpatient   Admission Date: 12/18/2023  Length of Stay: 3 days  Attending Physician: Tr Bruce MD  Primary Care Provider: Janet, Primary Doctor        Subjective:     Principal Problem:Diabetic ulcer of ankle        HPI:  [age] year female who resides in the group home, diabetes, noncompliance, she was not taking any diabetic medications for 2 years, left below-knee amputation came to hospital with posterior aspect of the heel ulcer and infection and cellulitis .  Reported ulcer to her right ankle that has begun 2 months ago and she had a fall about 1 month ago and got worse and draining .  Pain is worsening and feeling pain at the calf and came to ER .  She had left leg BKA in sept 2022. She was not taking DM medications more than 1 year ago. She did BKA in [facility] . She said she was on insulin during hospital stay   PMH as above   PSH . Thymus surgery ? For GBS , laparotomy for abdominal abscess and C section   Family history not significant        Overview/Hospital Course:  IR US guided aspiration ordered  Patient is upset she was NPO and wants to eat and sign out AMA. Explained risk even death to her and she hold back. But she can sign out AMA again . D/w pt and explained with RN and charge nurse     12/20  Admitted with ankle wound . But found to have very hard and swollen calf . DVT study neg. CT with possible abscess .   Today . She agree to do IR US aspiration . She is not very able to pay attention about  her sickness. She signed AMA but later stayed after discussion     12/21  Assumed care. Chart reviewed. Labs reviewed and AM labs ordered. For I & D today. No new complaints.     Interval History: for I&D    Review of Systems   Constitutional: Negative.    HENT: Negative.     Eyes: Negative.    Respiratory: Negative.     Cardiovascular: Negative.    Gastrointestinal:  Negative.    Endocrine: Negative.    Genitourinary: Negative.    Musculoskeletal: Negative.         Right leg pain from abscess   Skin: Negative.    Allergic/Immunologic: Negative.    Neurological: Negative.    Hematological: Negative.    All other systems reviewed and are negative.    Objective:     Vital Signs (Most Recent):  Temp: 98.1 °F (36.7 °C) (12/21/23 1119)  Pulse: 70 (12/21/23 1119)  Resp: 18 (12/21/23 1119)  BP: (!) 184/78 (12/21/23 1119)  SpO2: 95 % (12/21/23 1119) Vital Signs (24h Range):  Temp:  [98.1 °F (36.7 °C)-98.9 °F (37.2 °C)] 98.1 °F (36.7 °C)  Pulse:  [67-81] 70  Resp:  [18] 18  SpO2:  [95 %-96 %] 95 %  BP: (134-184)/(61-78) 184/78     Weight: 84.7 kg (186 lb 11.7 oz)  Body mass index is 31.07 kg/m².    Intake/Output Summary (Last 24 hours) at 12/21/2023 1136  Last data filed at 12/21/2023 0608  Gross per 24 hour   Intake 810 ml   Output 750 ml   Net 60 ml         Physical Exam  Vitals and nursing note reviewed.   Constitutional:       Appearance: She is well-developed.   HENT:      Head: Normocephalic and atraumatic.      Right Ear: External ear normal.      Left Ear: External ear normal.      Nose: Nose normal.   Eyes:      Conjunctiva/sclera: Conjunctivae normal.      Pupils: Pupils are equal, round, and reactive to light.   Cardiovascular:      Rate and Rhythm: Normal rate and regular rhythm.      Heart sounds: Normal heart sounds.   Pulmonary:      Effort: Pulmonary effort is normal.      Breath sounds: Normal breath sounds.      Comments: Decreased entry without adventitious sounds  Abdominal:      General: Bowel sounds are normal.      Palpations: Abdomen is soft.   Musculoskeletal:         General: Normal range of motion.      Cervical back: Normal range of motion and neck supple.      Comments: Left BKA   Skin:     General: Skin is warm and dry.      Capillary Refill: Capillary refill takes less than 2 seconds.   Neurological:      Mental Status: She is alert and oriented to person,  "place, and time.   Psychiatric:         Behavior: Behavior normal.         Thought Content: Thought content normal.         Judgment: Judgment normal.             Significant Labs: All pertinent labs within the past 24 hours have been reviewed.  CBC:   Recent Labs   Lab 12/20/23  0129 12/21/23  1105   WBC 14.02* 13.60*   HGB 11.8* 11.1*   HCT 36.0* 33.5*   * 447     CMP: No results for input(s): "NA", "K", "CL", "CO2", "GLU", "BUN", "CREATININE", "CALCIUM", "PROT", "ALBUMIN", "BILITOT", "ALKPHOS", "AST", "ALT", "ANIONGAP", "EGFRNONAA" in the last 48 hours.    Invalid input(s): "ESTGFAFR[place]"    Significant Imaging: I have reviewed all pertinent imaging results/findings within the past 24 hours.    Assessment/Plan:      * Diabetic ulcer of ankle  Patient's FSGs are uncontrolled due to hyperglycemia on current medication regimen.  Last A1c reviewed-   Lab Results   Component Value Date    HGBA1C 11.9 (H) 12/18/2023     Most recent fingerstick glucose reviewed- No results for input(s): "POCTGLUCOSE" in the last 24 hours.  Current correctional scale  High  Increase anti-hyperglycemic dose as follows-   Antihyperglycemics (From admission, onward)      Start     Stop Route Frequency Ordered    12/18/23 1647  insulin aspart U-100 pen 0-10 Units         -- SubQ Before meals & nightly PRN 12/18/23 1547          Hold Oral hypoglycemics while patient is in the hospital.  A1c and SSI   May need to start insulin   CT noted with possible abscess and IR aspiration US guided ordered   NPO and can feed if needed      Diabetic ulcer of right heel associated with type 2 diabetes mellitus  Patient's FSGs are uncontrolled due to hyperglycemia on current medication regimen.  Last A1c reviewed-   Lab Results   Component Value Date    HGBA1C 11.9 (H) 12/18/2023     Most recent fingerstick glucose reviewed- No results for input(s): "POCTGLUCOSE" in the last 24 hours.  Current correctional scale  Medium  Increase anti-hyperglycemic " dose as follows-   Antihyperglycemics (From admission, onward)      Start     Stop Route Frequency Ordered    12/18/23 1647  insulin aspart U-100 pen 0-10 Units         -- SubQ Before meals & nightly PRN 12/18/23 1547          Hold Oral hypoglycemics while patient is in the hospital.    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  A1c          VTE Risk Mitigation (From admission, onward)           Ordered     enoxaparin injection 40 mg  Daily         12/18/23 1501     IP VTE HIGH RISK PATIENT  Once         12/18/23 1501     Place sequential compression device  Until discontinued         12/18/23 1501                    Discharge Planning   SHONNA: 12/26/2023     Code Status: Full Code   Is the patient medically ready for discharge?:     Reason for patient still in hospital (select all that apply): Patient trending condition and Treatment  Discharge Plan A: Group Home   Discharge Delays: None known at this time              Tr Bruce MD  Department of Hospital Medicine   [facility]

## 2023-12-21 NOTE — SUBJECTIVE & OBJECTIVE
Interval History: for I&D    Review of Systems   Constitutional: Negative.    HENT: Negative.     Eyes: Negative.    Respiratory: Negative.     Cardiovascular: Negative.    Gastrointestinal: Negative.    Endocrine: Negative.    Genitourinary: Negative.    Musculoskeletal: Negative.         Right leg pain from abscess   Skin: Negative.    Allergic/Immunologic: Negative.    Neurological: Negative.    Hematological: Negative.    All other systems reviewed and are negative.    Objective:     Vital Signs (Most Recent):  Temp: 98.1 °F (36.7 °C) (12/21/23 1119)  Pulse: 70 (12/21/23 1119)  Resp: 18 (12/21/23 1119)  BP: (!) 184/78 (12/21/23 1119)  SpO2: 95 % (12/21/23 1119) Vital Signs (24h Range):  Temp:  [98.1 °F (36.7 °C)-98.9 °F (37.2 °C)] 98.1 °F (36.7 °C)  Pulse:  [67-81] 70  Resp:  [18] 18  SpO2:  [95 %-96 %] 95 %  BP: (134-184)/(61-78) 184/78     Weight: 84.7 kg (186 lb 11.7 oz)  Body mass index is 31.07 kg/m².    Intake/Output Summary (Last 24 hours) at 12/21/2023 1136  Last data filed at 12/21/2023 0608  Gross per 24 hour   Intake 810 ml   Output 750 ml   Net 60 ml         Physical Exam  Vitals and nursing note reviewed.   Constitutional:       Appearance: She is well-developed.   HENT:      Head: Normocephalic and atraumatic.      Right Ear: External ear normal.      Left Ear: External ear normal.      Nose: Nose normal.   Eyes:      Conjunctiva/sclera: Conjunctivae normal.      Pupils: Pupils are equal, round, and reactive to light.   Cardiovascular:      Rate and Rhythm: Normal rate and regular rhythm.      Heart sounds: Normal heart sounds.   Pulmonary:      Effort: Pulmonary effort is normal.      Breath sounds: Normal breath sounds.      Comments: Decreased entry without adventitious sounds  Abdominal:      General: Bowel sounds are normal.      Palpations: Abdomen is soft.   Musculoskeletal:         General: Normal range of motion.      Cervical back: Normal range of motion and neck supple.      Comments:  "Left BKA   Skin:     General: Skin is warm and dry.      Capillary Refill: Capillary refill takes less than 2 seconds.   Neurological:      Mental Status: She is alert and oriented to person, place, and time.   Psychiatric:         Behavior: Behavior normal.         Thought Content: Thought content normal.         Judgment: Judgment normal.             Significant Labs: All pertinent labs within the past 24 hours have been reviewed.  CBC:   Recent Labs   Lab 12/20/23  0129 12/21/23  1105   WBC 14.02* 13.60*   HGB 11.8* 11.1*   HCT 36.0* 33.5*   * 447     CMP: No results for input(s): "NA", "K", "CL", "CO2", "GLU", "BUN", "CREATININE", "CALCIUM", "PROT", "ALBUMIN", "BILITOT", "ALKPHOS", "AST", "ALT", "ANIONGAP", "EGFRNONAA" in the last 48 hours.    Invalid input(s): "ESTGFAFR[place]"    Significant Imaging: I have reviewed all pertinent imaging results/findings within the past 24 hours.  "

## 2023-12-21 NOTE — PROGRESS NOTES
Chief complaint:  Wound Check (Diabetic ulcer to right ankle. States it has been draining for a few days. Has not seen wound care and is concerned for infection. Area appears to be red swollen and draining. )      HPI:  Divya Meek is a 68 y.o. female presenting with an open DM ulcer of the posterior right heel. Pt had an xray that was negative for osteo. Pt has a large fluid pocket proximal to the open wound of the heel, which is undergoing treatment at this time for drainage. The heel wound has achilles involvement. She has significant pain in the heel, but unclear if it originated from the calf. No other wound problems at this time. Pt has a BKA of the left leg secondary to PAD related to tobacco abuse.     12/21/2023  Pt seen at bedside today for a FU for an open wound,DM ulcer of the posterior heel. Pt had surgery drain her calf abscess today, and was sitting up in the bed comfortably. Pt stated she felt so much better now that the leg was drained. Pt heel wound is stable but will need aggressive wound care if she is to have a chance at saving her leg. She stated she was not interested in discussing her heel, and that she needed food since they have only provided her with 4 meals since she was admitted. She kept reverting back to food every time I tried to redirect her to addressing the wound on her heel. She said she has my card, with my number and would follow up with me when she was ready. No other complaints today.    PMH:  As per HPI and below:  Past Medical History:   Diagnosis Date    Diabetes mellitus        Social History     Socioeconomic History    Marital status: Single   Substance and Sexual Activity    Drug use: Not Currently    Sexual activity: Not Currently       History reviewed. No pertinent surgical history.    History reviewed. No pertinent family history.    Review of patient's allergies indicates:  No Known Allergies    No current facility-administered medications on file prior to  "encounter.     No current outpatient medications on file prior to encounter.       ROS: As per HPI and below:  Pertinent items are noted in HPI.      Physical Exam:     Vitals:    23 1515 23 1530 23 1647 23 1716   BP: (!) 199/79 (!) 178/69  (!) 149/67   BP Location: Right arm Right arm     Patient Position: Lying Lying     Pulse: 68 67  72   Resp: 12 14  18   Temp:    98.3 °F (36.8 °C)   TempSrc:    Oral   SpO2: 99% 96% 96% 97%   Weight:       Height:           BP  Min: 121/70  Max: 199/79  Temp  Av.4 °F (35.2 °C)  Min: 37.4 °F (3 °C)  Max: 100.4 °F (38 °C)  Pulse  Av.7  Min: 67  Max: 84  Resp  Av.6  Min: 10  Max: 20  SpO2  Av.3 %  Min: 94 %  Max: 100 %  Height  Av' 5" (165.1 cm)  Min: 5' 5" (165.1 cm)  Max: 5' 5" (165.1 cm)  Weight  Av.2 kg (187 lb 11.9 oz)  Min: 84.6 kg (186 lb 8.2 oz)  Max: 86.2 kg (190 lb)    Body mass index is 31.07 kg/m².          General:             Well developed, well nourished, no apparent distress  HEENT:              NCAT, no JVD, mucous membranes moist, EOM intact  Cardiovascular:  Regular rate and rhythm, normal S1, normal S2, No murmurs, rubs, or gallops  Respiratory:        Normal breath sounds, no wheezes, no rales, no rhonchi  Abdomen:           Bowel sounds present, non tender, non distended, no masses, no hepatojugular reflux  Extremities:        No clubbing, no cyanosis, no edema  Vascular:            2+ b/l radial.  Peripheral pulses intact.  No carotid bruits.  Neurological:      No focal deficits  Skin:                   No obvious rashes or erythema, right posterior heel open DM ulcer, achilles involvement, cald fluid filled hematoma/abscess, surgery managing this area            Lab Results   Component Value Date    WBC 13.60 (H) 2023    HGB 11.1 (L) 2023    HCT 33.5 (L) 2023    MCV 84 2023     2023     No results found for: "CHOL"  No results found for: "HDL"  No results found for: " ""LDLCALC"  No results found for: "TRIG"  No results found for: "CHOLHDL"  CMP  US LOWER EXTREMITY VEINS LIMITED FOLLOW UP RIGHT     CLINICAL HISTORY:  68 years Female pain     FINDINGS: Grayscale, color and spectral Doppler analysis of the right lower extremity deep venous system was performed.     There is normal compressibility, with normal flow by color and spectral Doppler analysis in the right lower extremity deep venous system, with normal augmentation and no evidence of deep venous thrombosis.     At the area of interest labeled right posterior calf area of lump, there is subcutaneous edema, as well as a ovoid, mixed echogenicity structure measuring 8.2 x 3.0 x 5.9 cm. This appears to contain internal fluid fluid level raising the possibility of hematoma.     IMPRESSION:     Negative for right lower extremity DVT.     Large (greater than 8 cm) mixed echogenicity lesion involving the right posterior calf. This could potentially represent hematoma if there is history of trauma. Consider dedicated MRI follow-up, and correlate with pending CT lower extremity which has been ordered but not yet performed at the time of dictation.     Electronically signed by:  Martin Mckeon MD  12/18/2023 04:33 PM CST Workstation: [id number]  X-Ray Ankle Complete Right  Order: [id number]  Status: Final result       Visible to patient: No (inaccessible in Patient Portal)       Next appt: None       Dx: Ankle pain, right    0 Result Notes  Details    Reading Physician Reading Date Result Priority   Yohan Hamm MD  [phone] 12/18/2023      Narrative & Impression  Right ankle 3 views     CLINICAL DATA: Right ankle pain     FINDINGS: 3 views are negative for fracture or dislocation. No osseous destructive lesion or significant arthritic change is identified.     Atherosclerotic calcification of the posterior tibial artery is noted.     IMPRESSION:  1. No acute radiographic abnormalities.  2. Mild sclerotic calcification of " the posterior tibial artery.     Electronically signed by:  Yohan Hamm MD  12/18/2023 02:34 PM [facility] Workstation: [id number]     CT Leg (Tibia-Fibula) Wtih Contrast Right  Order: [id number]  Status: Final result       Visible to patient: No (inaccessible in Patient Portal)       Next appt: None    0 Result Notes  Details    Reading Physician Reading Date Result Priority   Shahriar Hugo MD  [phone] 12/18/2023      Narrative & Impression  CMS MANDATED QUALITY DATA - CT RADIATION - 436     One or more of the following dose-optimizing techniques was utilized for this exam: automated exposure control, adjustment of the mA and/or kV according to patient size, and/or use of iterative reconstruction technique.     HISTORY: Osteomyelitis suspected.     TECHNIQUE: Serial axial images were obtained from distal femur through the right ankle with 100 mL of Omnipaque 350 intravenous contrast. Coronal and sagittal reconstructions were performed. Patient received 217 mGy-cm radiation exposure from this exam.     COMPARISON: No previous study for comparison.     FINDINGS: Subcutaneous edema of the visualized portion the right lower extremity is present, greatest laterally and posteriorly. Arterial atherosclerotic changes are noted involving the popliteal and infrapopliteal arterial system. An elliptical fluid collection is visualized deep to the gastrocnemius muscle and superficial to the soleus muscle measuring 2.9 x 1.0 cm in size with a craniocaudal extent of approximately 8 cm. This may represent a liquefied hematoma or abscess. No soft tissue emphysema is identified. No fracture or dislocation is identified. Degenerative changes of the tarsal bones are noted. Mild degenerative changes of the knee and ankle are also visualized.     IMPRESSION:  1. Elliptical soft tissue fluid collection deep to the gastrocnemius muscle which may represent liquefied hematoma or abscess.  2. Subcutaneous edema of the right lower  extremity as described above.  3. Atherosclerotic changes.     Electronically signed by:  Shahriar Hugo MD  12/18/2023 06:32 PM CST Workstation: [id number]     Assessment and Recommendations       Diagnoses:    1. Right posterior heel DM ulcer with achilles involvement    Plan:  1. Santyl + aquacell Ag to the right heel daily  2. Pt will FU at the wound clinic OP when she is ready per the patient    Complexity:    moderate

## 2023-12-21 NOTE — ANESTHESIA POSTPROCEDURE EVALUATION
Anesthesia Post Evaluation    Patient: Divya Meek    Procedure(s) Performed: Procedure(s) (LRB):  INCISION AND DRAINAGE, ABSCESS (Right)    Final Anesthesia Type: general      Patient location during evaluation: PACU  Patient participation: Yes- Able to Participate  Level of consciousness: awake and alert and oriented  Post-procedure vital signs: reviewed and stable  Pain management: adequate  Airway patency: patent    PONV status at discharge: No PONV  Anesthetic complications: no      Cardiovascular status: blood pressure returned to baseline and hemodynamically stable  Respiratory status: unassisted, spontaneous ventilation and room air  Hydration status: euvolemic  Follow-up not needed.              Vitals Value Taken Time   /79 12/21/23 1515   Temp 36.2 °C (97.2 °F) 12/21/23 1412   Pulse 66 12/21/23 1529   Resp 13 12/21/23 1529   SpO2 98 % 12/21/23 1529   Vitals shown include unvalidated device data.      No case tracking events are documented in the log.      Pain/Wojciech Score: Pain Rating Prior to Med Admin: 7 (12/21/2023  3:15 PM)  Pain Rating Post Med Admin: 0 (12/21/2023 10:26 AM)  Wojciech Score: 10 (12/21/2023  2:45 PM)

## 2023-12-21 NOTE — ANESTHESIA PREPROCEDURE EVALUATION
12/21/2023  Divya Meek is a 68 y.o., female.      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 No pertinent surgical history.     Tobacco Use:  The patient  has no history on file for tobacco use.     No results found for this or any previous visit.     Imaging Results              CT Leg (Tibia-Fibula) Wtih Contrast Right (Final result)  Result time 12/18/23 18:32:41   Procedure changed from CT Leg (Tibia-Fibula) W W/O Contrast Right     Final result by Shahriar Hugo MD (12/18/23 18:32:41)                   Narrative:    CMS MANDATED QUALITY DATA - CT RADIATION - 436    One or more of the following dose-optimizing techniques was utilized for this exam: automated exposure control, adjustment of the mA and/or kV according to patient size, and/or use of iterative reconstruction technique.    HISTORY: Osteomyelitis suspected.    TECHNIQUE: Serial axial images were obtained from distal femur through the right ankle with 100 mL of Omnipaque 350 intravenous contrast. Coronal and sagittal reconstructions were performed. Patient received 217 mGy-cm radiation exposure from this exam.    COMPARISON: No previous study for comparison.    FINDINGS: Subcutaneous edema of the visualized portion the right lower extremity is present, greatest laterally and posteriorly. Arterial atherosclerotic changes are noted involving the popliteal and infrapopliteal arterial system. An elliptical fluid collection is visualized deep to the gastrocnemius muscle and superficial to the soleus muscle measuring 2.9 x 1.0 cm in size with a craniocaudal extent of approximately 8 cm. This may represent a liquefied hematoma or abscess. No  soft tissue emphysema is identified. No fracture or dislocation is identified. Degenerative changes of the tarsal bones are noted. Mild degenerative changes of the knee and ankle are also visualized.    IMPRESSION:  1. Elliptical soft tissue fluid collection deep to the gastrocnemius muscle which may represent liquefied hematoma or abscess.  2. Subcutaneous edema of the right lower extremity as described above.  3. Atherosclerotic changes.    Electronically signed by:  Shahriar Hugo MD  12/18/2023 06:32 PM CST Workstation: [id number]                                     US Lower Extremity Veins Right (Final result)  Result time 12/18/23 16:33:35      Final result by Martin Mckeon MD (12/18/23 16:33:35)                   Narrative:    US LOWER EXTREMITY VEINS LIMITED FOLLOW UP RIGHT    CLINICAL HISTORY:  68 years Female pain    FINDINGS: Grayscale, color and spectral Doppler analysis of the right lower extremity deep venous system was performed.    There is normal compressibility, with normal flow by color and spectral Doppler analysis in the right lower extremity deep venous system, with normal augmentation and no evidence of deep venous thrombosis.    At the area of interest labeled right posterior calf area of lump, there is subcutaneous edema, as well as a ovoid, mixed echogenicity structure measuring 8.2 x 3.0 x 5.9 cm. This appears to contain internal fluid fluid level raising the possibility of hematoma.    IMPRESSION:    Negative for right lower extremity DVT.    Large (greater than 8 cm) mixed echogenicity lesion involving the right posterior calf. This could potentially represent hematoma if there is history of trauma. Consider dedicated MRI follow-up, and correlate with pending CT lower extremity which has been ordered but not yet performed at the time of dictation.    Electronically signed by:  Martin Mckeon MD  12/18/2023 04:33 PM CST Workstation: [id number]                                      X-Ray Ankle Complete Right (Final result)  Result time 12/18/23 14:34:46      Final result by Yohan Hamm MD (12/18/23 14:34:46)                   Narrative:    Right ankle 3 views    CLINICAL DATA: Right ankle pain    FINDINGS: 3 views are negative for fracture or dislocation. No osseous destructive lesion or significant arthritic change is identified.    Atherosclerotic calcification of the posterior tibial artery is noted.    IMPRESSION:  1. No acute radiographic abnormalities.  2. Mild sclerotic calcification of the posterior tibial artery.    Electronically signed by:  Yohan Hamm MD  12/18/2023 02:34 PM CST Workstation: [id number]                                     Lab Results   Component Value Date    WBC 14.02 (H) 12/20/2023    HGB 11.8 (L) 12/20/2023    HCT 36.0 (L) 12/20/2023    MCV 86 12/20/2023     (H) 12/20/2023     BMP  Lab Results   Component Value Date     (L) 12/18/2023    K 3.6 12/18/2023    CL 98 12/18/2023    CO2 24 12/18/2023    BUN 10 12/18/2023    CREATININE 0.4 (L) 12/18/2023    CALCIUM 8.7 12/18/2023    ANIONGAP 13 12/18/2023     (H) 12/18/2023       No results found for this or any previous visit.              Pre-op Assessment    I have reviewed the Patient Summary Reports.     I have reviewed the Nursing Notes. I have reviewed the NPO Status.   I have reviewed the Medications.     Review of Systems  Anesthesia Hx:  No problems with previous Anesthesia             Denies Family Hx of Anesthesia complications.    Denies Personal Hx of Anesthesia complications.                    Social:  Smoker       Hematology/Oncology:  Hematology Normal                                     Cardiovascular:  Cardiovascular Normal                                            Pulmonary:  Pulmonary Normal                       Hepatic/GI:  Hepatic/GI Normal                 Musculoskeletal:  Musculoskeletal Normal    S/p L BKA, now with R ankle and foot ulceration             Neurological:  Neurology Normal                                      Endocrine:  Diabetes, poorly controlled, type 2               Physical Exam  General: Well nourished, Cooperative, Alert and Oriented    Airway:  Mallampati: III / II  Mouth Opening: Normal  TM Distance: Normal  Tongue: Normal  Neck ROM: Normal ROM    Dental:  Intact    Chest/Lungs:  Clear to auscultation    Heart:  Rate: Normal  Rhythm: Regular Rhythm  Sounds: Normal    Abdomen:  Normal, Soft, Nontender      Anesthesia Plan  Type of Anesthesia, risks & benefits discussed:    Anesthesia Type: Gen Supraglottic Airway  Intra-op Monitoring Plan: Standard ASA Monitors  Post Op Pain Control Plan: multimodal analgesia and IV/PO Opioids PRN  Induction:  IV  Airway Plan: Video, Post-Induction  Informed Consent: Informed consent signed with the Patient and all parties understand the risks and agree with anesthesia plan.  All questions answered.   ASA Score: 3 Emergent  Anesthesia Plan Notes:   **CHART PREOP**  General - LMA OK (? Position)  IV tylenol  Zofran Pepcid   No Decadron - active infection    Ready For Surgery From Anesthesia Perspective.     .

## 2023-12-21 NOTE — CONSULTS
"Chief complaint:  Wound Check (Diabetic ulcer to right ankle. States it has been draining for a few days. Has not seen wound care and is concerned for infection. Area appears to be red swollen and draining. )      HPI:  Divya Meek is a 68 y.o. female presenting with an open DM ulcer of the posterior right heel. Pt had an xray that was negative for osteo. Pt has a large fluid pocket proximal to the open wound of the heel, which is undergoing treatment at this time for drainage. The heel wound has achilles involvement. She has significant pain in the heel, but unclear if it originated from the calf. No other wound problems at this time. Pt has a BKA of the left leg secondary to PAD related to tobacco abuse.     PMH:  As per HPI and below:  Past Medical History:   Diagnosis Date    Diabetes mellitus        Social History     Socioeconomic History    Marital status: Single   Substance and Sexual Activity    Drug use: Not Currently    Sexual activity: Not Currently       History reviewed. No pertinent surgical history.    History reviewed. No pertinent family history.    Review of patient's allergies indicates:  No Known Allergies    No current facility-administered medications on file prior to encounter.     No current outpatient medications on file prior to encounter.       ROS: As per HPI and below:  Pertinent items are noted in HPI.      Physical Exam:     Vitals:    23 1210 23 1623 23 1818 23 1937   BP:  (!) 156/66  134/61   Pulse:  75  81   Resp: 18 18 18 18   Temp:  98.4 °F (36.9 °C)  98.7 °F (37.1 °C)   TempSrc:    Oral   SpO2:  96%  95%   Weight:       Height:           BP  Min: 121/70  Max: 195/87  Temp  Av.5 °F (34.7 °C)  Min: 37.4 °F (3 °C)  Max: 100.4 °F (38 °C)  Pulse  Av.3  Min: 72  Max: 84  Resp  Av.3  Min: 10  Max: 18  SpO2  Av.1 %  Min: 94 %  Max: 100 %  Height  Av' 5" (165.1 cm)  Min: 5' 5" (165.1 cm)  Max: 5' 5" (165.1 cm)  Weight  Av.2 kg (187 " "lb 11.9 oz)  Min: 84.6 kg (186 lb 8.2 oz)  Max: 86.2 kg (190 lb)    Body mass index is 31.07 kg/m².          General:             Well developed, well nourished, no apparent distress  HEENT:              NCAT, no JVD, mucous membranes moist, EOM intact  Cardiovascular:  Regular rate and rhythm, normal S1, normal S2, No murmurs, rubs, or gallops  Respiratory:        Normal breath sounds, no wheezes, no rales, no rhonchi  Abdomen:           Bowel sounds present, non tender, non distended, no masses, no hepatojugular reflux  Extremities:        No clubbing, no cyanosis, no edema  Vascular:            2+ b/l radial.  Peripheral pulses intact.  No carotid bruits.  Neurological:      No focal deficits  Skin:                   No obvious rashes or erythema, right posterior heel open DM ulcer, achilles involvement, cald fluid filled hematoma/abscess, surgery managing this area            Lab Results   Component Value Date    WBC 14.02 (H) 12/20/2023    HGB 11.8 (L) 12/20/2023    HCT 36.0 (L) 12/20/2023    MCV 86 12/20/2023     (H) 12/20/2023     No results found for: "CHOL"  No results found for: "HDL"  No results found for: "LDLCALC"  No results found for: "TRIG"  No results found for: "CHOLHDL"  CMP  US LOWER EXTREMITY VEINS LIMITED FOLLOW UP RIGHT     CLINICAL HISTORY:  68 years Female pain     FINDINGS: Grayscale, color and spectral Doppler analysis of the right lower extremity deep venous system was performed.     There is normal compressibility, with normal flow by color and spectral Doppler analysis in the right lower extremity deep venous system, with normal augmentation and no evidence of deep venous thrombosis.     At the area of interest labeled right posterior calf area of lump, there is subcutaneous edema, as well as a ovoid, mixed echogenicity structure measuring 8.2 x 3.0 x 5.9 cm. This appears to contain internal fluid fluid level raising the possibility of hematoma.     IMPRESSION:     Negative for " right lower extremity DVT.     Large (greater than 8 cm) mixed echogenicity lesion involving the right posterior calf. This could potentially represent hematoma if there is history of trauma. Consider dedicated MRI follow-up, and correlate with pending CT lower extremity which has been ordered but not yet performed at the time of dictation.     Electronically signed by:  Martin Mckeon MD  12/18/2023 04:33 PM CST Workstation: [id number]  X-Ray Ankle Complete Right  Order: [id number]  Status: Final result       Visible to patient: No (inaccessible in Patient Portal)       Next appt: None       Dx: Ankle pain, right    0 Result Notes  Details    Reading Physician Reading Date Result Priority   Yohan Hamm MD  [phone] 12/18/2023      Narrative & Impression  Right ankle 3 views     CLINICAL DATA: Right ankle pain     FINDINGS: 3 views are negative for fracture or dislocation. No osseous destructive lesion or significant arthritic change is identified.     Atherosclerotic calcification of the posterior tibial artery is noted.     IMPRESSION:  1. No acute radiographic abnormalities.  2. Mild sclerotic calcification of the posterior tibial artery.     Electronically signed by:  Yohan Hamm MD  12/18/2023 02:34 PM CST Workstation: [id number]     CT Leg (Tibia-Fibula) Wtih Contrast Right  Order: [id number]  Status: Final result       Visible to patient: No (inaccessible in Patient Portal)       Next appt: None    0 Result Notes  Details    Reading Physician Reading Date Result Priority   Shahriar Hugo MD  [phone] 12/18/2023      Narrative & Impression  CMS MANDATED QUALITY DATA - CT RADIATION - 436     One or more of the following dose-optimizing techniques was utilized for this exam: automated exposure control, adjustment of the mA and/or kV according to patient size, and/or use of iterative reconstruction technique.     HISTORY: Osteomyelitis suspected.     TECHNIQUE: Serial axial images were  obtained from distal femur through the right ankle with 100 mL of Omnipaque 350 intravenous contrast. Coronal and sagittal reconstructions were performed. Patient received 217 mGy-cm radiation exposure from this exam.     COMPARISON: No previous study for comparison.     FINDINGS: Subcutaneous edema of the visualized portion the right lower extremity is present, greatest laterally and posteriorly. Arterial atherosclerotic changes are noted involving the popliteal and infrapopliteal arterial system. An elliptical fluid collection is visualized deep to the gastrocnemius muscle and superficial to the soleus muscle measuring 2.9 x 1.0 cm in size with a craniocaudal extent of approximately 8 cm. This may represent a liquefied hematoma or abscess. No soft tissue emphysema is identified. No fracture or dislocation is identified. Degenerative changes of the tarsal bones are noted. Mild degenerative changes of the knee and ankle are also visualized.     IMPRESSION:  1. Elliptical soft tissue fluid collection deep to the gastrocnemius muscle which may represent liquefied hematoma or abscess.  2. Subcutaneous edema of the right lower extremity as described above.  3. Atherosclerotic changes.     Electronically signed by:  Shahriar Hugo MD  12/18/2023 06:32 PM [facility] Workstation: [id number]     Assessment and Recommendations       Diagnoses:    1. Right posterior heel DM ulcer with achilles involvement    Plan:  1. Santyl + aquacell Ag to the right heel daily  2. Waiting on resolution of the fluid filled calf abscess/hematoma, will continue to follow    Complexity:    moderate

## 2023-12-21 NOTE — ANESTHESIA PROCEDURE NOTES
Intubation    Date/Time: 12/21/2023 1:29 PM    Performed by: Yayo Frances CRNA  Authorized by: Vivek Payan MD    Intubation:     Induction:  Intravenous    Intubated:  Postinduction    Mask Ventilation:  Easy mask    Attempts:  1    Attempted By:  CRNA    Method of Intubation:  Video laryngoscopy    Blade:  Heredia 3    Laryngeal View Grade: Grade I - full view of cords      Difficult Airway Encountered?: No      Complications:  None    Airway Device:  Oral endotracheal tube    Airway Device Size:  7.0    Style/Cuff Inflation:  Cuffed    Inflation Amount (mL):  5    Tube secured:  20    Placement Verified By:  Capnometry    Complicating Factors:  None    Findings Post-Intubation:  BS equal bilateral       Spoke with mom.   Rodo is taking 40-60mL every 3 hours in a 30 minute period.   Her weight is still decreasing so Dr. Carty recommended increasing her caloric intake.   We will mix formula to achieve a 28kcal/oz formula for now. I advised mom to mix 4 ounces of water to 3 scoops of formula to achieve this concentration. Then feed 2 ounces over a 30 minute period.   Once Rodo is established with speech and her NAM, we can re-evaluate whether fortini is needed or if we can decreased her kcal/oz intake.

## 2023-12-21 NOTE — PROGRESS NOTES
Pharmacokinetic Assessment Follow Up: IV Vancomycin    Patient brief summary:  Divya Meek is a 68 y.o. female initiated on antimicrobial therapy with IV Vancomycin for treatment of Skin & Soft Tissue infection    The patient's current regimen is Vancomycin 1500 mg every 10 hours.       Actual Body Weight = 84.7 kg (186 lb 11.7 oz).    Renal Function:   Estimated Creatinine Clearance: 142.7 mL/min (A) (based on SCr of 0.4 mg/dL (L)).      Vancomycin serum concentration assessment(s):    The trough level was drawn correctly and can be used to guide therapy at this time. The measurement is above the desired definitive target range of 10 to 15 mcg/mL.    Vancomycin Regimen Plan:    Change regimen to Vancomycin 1500 mg IV every 12 hours with next serum trough concentration measured at 21:00 prior to 8th dose on 12/21.    Drug levels (last 3 results):  Recent Labs   Lab Result Units 12/20/23  0129 12/20/23  1351 12/20/23  2219   Vancomycin-Trough ug/mL 12.6 41.2* 18.5       Pharmacy will continue to follow and monitor vancomycin.    Please contact pharmacy at extension [phone] for questions regarding this assessment.    Thank you for the consult,   Kaleigh Shipman

## 2023-12-21 NOTE — PLAN OF CARE
12/21/23 0935   Discharge Reassessment   Assessment Type Discharge Planning Reassessment   Did the patient's condition or plan change since previous assessment? No   Discharge Plan discussed with: Patient   Communicated SHONNA with patient/caregiver Date not available/Unable to determine   Discharge Plan A Group Home   Discharge Plan B Group home   DME Needed Upon Discharge  none   Transition of Care Barriers No family/friends to help;Mobility;Transportation   Why the patient remains in the hospital Requires continued medical care   Post-Acute Status   Discharge Delays None known at this time     Spoke to pt at bedside who intends to return to group home at discharge.

## 2023-12-21 NOTE — OP NOTE
Critical access hospital  Surgery Department  Operative Note    SUMMARY     Date of Procedure: 12/21/2023     Procedure: Procedure(s) (LRB):  INCISION AND DRAINAGE, ABSCESS (Right) , deep in the right calf    Surgeon(s) and Role:     * Juan Carlos Meyers MD - Primary    Assisting Surgeon: None    Pre-Operative Diagnosis: Abscess [L02.91]    Post-Operative Diagnosis: Post-Op Diagnosis Codes:     * Abscess [L02.91]    Anesthesia: General    Operative Findings (including complications, if any):  Incision and drainage of a deep space complex abscess within the gastrocnemius on the right below the investing fascia  Description of Technical Procedures:  This is a 68-year-old female who presented with right leg pain.  Initial aspiration showed purulent fluid within the gastroc musculature.  I recommended incision and drainage.  She did consent to the planned procedure.  She was brought to the OR, placed supine, general endotracheal anesthesia was induced, she was prone, the right lower leg was prepped and draped in the usual fashion, a time-out was completed.  A 3 cm incision was made sharply at the site of her prior aspiration.  Deep tissues were divided using electrocautery down to the investing fascia of the gastrocnemius.  This was incised in line with its fibers.  Blunt probing of the muscle belly revealed a large abscess cavity with purulent fluid.  Loculations were broken up bluntly.  Cultures were taken within the abscess cavity.  The wound was then copiously irrigated with saline and then loosely packed with Betadine soaked gauze.  A dressing was applied.  Patient tolerated the entire procedure without apparent complication, was extubated in the OR, brought to recovery in stable condition.  All counts were correct.    Significant Surgical Tasks Conducted by the Assistant(s), if Applicable: n/a    Estimated Blood Loss (EBL):min           Implants: * No implants in log *    Specimens:   Cultures           Condition:  Good    Disposition: PACU - hemodynamically stable.    Attestation: I was present and scrubbed for the entire procedure.

## 2023-12-21 NOTE — TRANSFER OF CARE
"Anesthesia Transfer of Care Note    Patient: Divya Meek    Procedure(s) Performed: Procedure(s) (LRB):  INCISION AND DRAINAGE, ABSCESS (Right)    Patient location: PACU    Anesthesia Type: general    Transport from OR: Transported from OR on room air with adequate spontaneous ventilation    Post pain: adequate analgesia    Post assessment: no apparent anesthetic complications    Post vital signs: stable    Level of consciousness: awake    Nausea/Vomiting: no nausea/vomiting    Complications: none    Transfer of care protocol was followedComments: Patient stable, report to RN, questions answered.  I am available during the recovery time for any further needs       Last vitals: Visit Vitals  BP (!) 184/78   Pulse 70   Temp 36.7 °C (98.1 °F) (Oral)   Resp 18   Ht 5' 5" (1.651 m)   Wt 84.7 kg (186 lb 11.7 oz)   SpO2 95%   Breastfeeding No   BMI 31.07 kg/m²     "

## 2023-12-21 NOTE — PLAN OF CARE
Problem: Adult Inpatient Plan of Care  Goal: Plan of Care Review  Outcome: Ongoing, Progressing  Goal: Patient-Specific Goal (Individualized)  Outcome: Ongoing, Progressing  Goal: Absence of Hospital-Acquired Illness or Injury  Outcome: Ongoing, Progressing  Goal: Optimal Comfort and Wellbeing  Outcome: Ongoing, Progressing  Goal: Readiness for Transition of Care  Outcome: Ongoing, Progressing     Problem: Diabetes Comorbidity  Goal: Blood Glucose Level Within Targeted Range  Outcome: Ongoing, Progressing     Problem: Skin Injury Risk Increased  Goal: Skin Health and Integrity  Outcome: Ongoing, Progressing     Problem: Impaired Wound Healing  Goal: Optimal Wound Healing  Outcome: Ongoing, Progressing     Problem: Infection  Goal: Absence of Infection Signs and Symptoms  Outcome: Ongoing, Progressing     Problem: Fall Injury Risk  Goal: Absence of Fall and Fall-Related Injury  Outcome: Ongoing, Progressing

## 2023-12-21 NOTE — PLAN OF CARE
12/21/23 1157   Post-Acute Status   Post-Acute Authorization Placement   Post-Acute Placement Status Pending post-acute provider review/more information requested     When speaking with pt she was not opposed to going to [facility] if needed, lilliam reached out to Mendy with [facility] who will evaluate the pt for admissions.

## 2023-12-22 VITALS
BODY MASS INDEX: 31.11 KG/M2 | TEMPERATURE: 99 F | HEIGHT: 65 IN | SYSTOLIC BLOOD PRESSURE: 169 MMHG | HEART RATE: 79 BPM | RESPIRATION RATE: 18 BRPM | OXYGEN SATURATION: 94 % | DIASTOLIC BLOOD PRESSURE: 82 MMHG | WEIGHT: 186.75 LBS

## 2023-12-22 LAB
ANION GAP SERPL CALC-SCNC: 9 MMOL/L (ref 8–16)
BACTERIA SPEC AEROBE CULT: ABNORMAL
BACTERIA SPEC AEROBE CULT: ABNORMAL
BASOPHILS # BLD AUTO: 0.05 K/UL (ref 0–0.2)
BASOPHILS NFR BLD: 0.3 % (ref 0–1.9)
BUN SERPL-MCNC: 14 MG/DL (ref 8–23)
CALCIUM SERPL-MCNC: 8.5 MG/DL (ref 8.7–10.5)
CHLORIDE SERPL-SCNC: 95 MMOL/L (ref 95–110)
CO2 SERPL-SCNC: 28 MMOL/L (ref 23–29)
CREAT SERPL-MCNC: 1 MG/DL (ref 0.5–1.4)
DIFFERENTIAL METHOD BLD: ABNORMAL
EOSINOPHIL # BLD AUTO: 0 K/UL (ref 0–0.5)
EOSINOPHIL NFR BLD: 0 % (ref 0–8)
ERYTHROCYTE [DISTWIDTH] IN BLOOD BY AUTOMATED COUNT: 12.7 % (ref 11.5–14.5)
EST. GFR  (NO RACE VARIABLE): >60 ML/MIN/1.73 M^2
GLUCOSE SERPL-MCNC: 214 MG/DL (ref 70–110)
GLUCOSE SERPL-MCNC: 236 MG/DL (ref 70–110)
GLUCOSE SERPL-MCNC: 244 MG/DL (ref 70–110)
GLUCOSE SERPL-MCNC: 255 MG/DL (ref 70–110)
GLUCOSE SERPL-MCNC: 259 MG/DL (ref 70–110)
GRAM STN SPEC: NORMAL
HCT VFR BLD AUTO: 34.1 % (ref 37–48.5)
HGB BLD-MCNC: 11.2 G/DL (ref 12–16)
IMM GRANULOCYTES # BLD AUTO: 0.08 K/UL (ref 0–0.04)
IMM GRANULOCYTES NFR BLD AUTO: 0.5 % (ref 0–0.5)
LYMPHOCYTES # BLD AUTO: 1.3 K/UL (ref 1–4.8)
LYMPHOCYTES NFR BLD: 8.9 % (ref 18–48)
MCH RBC QN AUTO: 28 PG (ref 27–31)
MCHC RBC AUTO-ENTMCNC: 32.8 G/DL (ref 32–36)
MCV RBC AUTO: 85 FL (ref 82–98)
MONOCYTES # BLD AUTO: 1 K/UL (ref 0.3–1)
MONOCYTES NFR BLD: 6.4 % (ref 4–15)
NEUTROPHILS # BLD AUTO: 12.6 K/UL (ref 1.8–7.7)
NEUTROPHILS NFR BLD: 83.9 % (ref 38–73)
NRBC BLD-RTO: 0 /100 WBC
PLATELET # BLD AUTO: 437 K/UL (ref 150–450)
PMV BLD AUTO: 9.5 FL (ref 9.2–12.9)
POTASSIUM SERPL-SCNC: 3.7 MMOL/L (ref 3.5–5.1)
RBC # BLD AUTO: 4 M/UL (ref 4–5.4)
SODIUM SERPL-SCNC: 132 MMOL/L (ref 136–145)
WBC # BLD AUTO: 14.97 K/UL (ref 3.9–12.7)

## 2023-12-22 PROCEDURE — 80048 BASIC METABOLIC PNL TOTAL CA: CPT | Performed by: INTERNAL MEDICINE

## 2023-12-22 PROCEDURE — 63600175 PHARM REV CODE 636 W HCPCS: Performed by: INTERNAL MEDICINE

## 2023-12-22 PROCEDURE — 36415 COLL VENOUS BLD VENIPUNCTURE: CPT | Performed by: INTERNAL MEDICINE

## 2023-12-22 PROCEDURE — 25000003 PHARM REV CODE 250: Performed by: INTERNAL MEDICINE

## 2023-12-22 PROCEDURE — 85025 COMPLETE CBC W/AUTO DIFF WBC: CPT | Performed by: INTERNAL MEDICINE

## 2023-12-22 RX ORDER — CEFAZOLIN SODIUM 1 G/50ML
1 SOLUTION INTRAVENOUS
Status: DISCONTINUED | OUTPATIENT
Start: 2023-12-22 | End: 2023-12-22 | Stop reason: HOSPADM

## 2023-12-22 RX ORDER — CEFAZOLIN SODIUM 1 G/50ML
1 SOLUTION INTRAVENOUS EVERY 6 HOURS
Start: 2023-12-22

## 2023-12-22 RX ORDER — INSULIN ASPART 100 [IU]/ML
0-10 INJECTION, SOLUTION INTRAVENOUS; SUBCUTANEOUS
Refills: 0
Start: 2023-12-22 | End: 2024-12-21

## 2023-12-22 RX ADMIN — HYDROMORPHONE HYDROCHLORIDE 1 MG: 1 INJECTION, SOLUTION INTRAMUSCULAR; INTRAVENOUS; SUBCUTANEOUS at 05:12

## 2023-12-22 RX ADMIN — ENOXAPARIN SODIUM 40 MG: 40 INJECTION SUBCUTANEOUS at 05:12

## 2023-12-22 RX ADMIN — HYDROMORPHONE HYDROCHLORIDE 1 MG: 1 INJECTION, SOLUTION INTRAMUSCULAR; INTRAVENOUS; SUBCUTANEOUS at 04:12

## 2023-12-22 RX ADMIN — CEFAZOLIN SODIUM 1 G: 1 SOLUTION INTRAVENOUS at 10:12

## 2023-12-22 RX ADMIN — COLLAGENASE SANTYL: 250 OINTMENT TOPICAL at 09:12

## 2023-12-22 RX ADMIN — INSULIN ASPART 4 UNITS: 100 INJECTION, SOLUTION INTRAVENOUS; SUBCUTANEOUS at 02:12

## 2023-12-22 RX ADMIN — INSULIN ASPART 4 UNITS: 100 INJECTION, SOLUTION INTRAVENOUS; SUBCUTANEOUS at 05:12

## 2023-12-22 RX ADMIN — CEFAZOLIN SODIUM 1 G: 1 SOLUTION INTRAVENOUS at 05:12

## 2023-12-22 RX ADMIN — PIPERACILLIN SODIUM AND TAZOBACTAM SODIUM 3.38 G: 3; .375 INJECTION, POWDER, LYOPHILIZED, FOR SOLUTION INTRAVENOUS at 06:12

## 2023-12-22 RX ADMIN — HYDROMORPHONE HYDROCHLORIDE 1 MG: 1 INJECTION, SOLUTION INTRAMUSCULAR; INTRAVENOUS; SUBCUTANEOUS at 10:12

## 2023-12-22 RX ADMIN — INSULIN ASPART 6 UNITS: 100 INJECTION, SOLUTION INTRAVENOUS; SUBCUTANEOUS at 05:12

## 2023-12-22 NOTE — PLAN OF CARE
"Pt AAOx 4. At start of shift pt vented frustration about the care she's been receiving here. This nurse listened to her and allowed her to direct the care she received on this shift. Pt requested to not be disturbed once she fell into sleep. This request was carried out as best as possible. Pt appeared to rest well this shift. Pt endorsed getting "good sleep." Hyperglycemia managed with SSI. Pain managed with PRN meds. No signs, symptoms, or complaints of distress at this time. Care ongoing.     Problem: Adult Inpatient Plan of Care  Goal: Plan of Care Review  Outcome: Ongoing, Progressing  Goal: Patient-Specific Goal (Individualized)  Outcome: Ongoing, Progressing  Goal: Absence of Hospital-Acquired Illness or Injury  Outcome: Ongoing, Progressing  Goal: Optimal Comfort and Wellbeing  Outcome: Ongoing, Progressing  Goal: Readiness for Transition of Care  Outcome: Ongoing, Progressing     Problem: Diabetes Comorbidity  Goal: Blood Glucose Level Within Targeted Range  Outcome: Ongoing, Progressing     Problem: Skin Injury Risk Increased  Goal: Skin Health and Integrity  Outcome: Ongoing, Progressing     Problem: Impaired Wound Healing  Goal: Optimal Wound Healing  Outcome: Ongoing, Progressing     Problem: Infection  Goal: Absence of Infection Signs and Symptoms  Outcome: Ongoing, Progressing     Problem: Fall Injury Risk  Goal: Absence of Fall and Fall-Related Injury  Outcome: Ongoing, Progressing     "

## 2023-12-22 NOTE — PLAN OF CARE
12/22/23 1442   Final Note   Assessment Type Final Discharge Note   Anticipated Discharge Disposition Long Term   What phone number can be called within the next 1-3 days to see how you are doing after discharge? [phone]   Post-Acute Status   Post-Acute Placement Status Set-up Complete/Auth obtained   Discharge Delays (!) Ambulance Transport/Facility Transport     Pt to [facility] to [facility].  [facility] to call nurse when ready for report and will set up transportation when completed.  No other needs identified at this time.

## 2023-12-22 NOTE — PT/OT/SLP PROGRESS
Occupational Therapy      Patient Name:  Divya Meek   MRN:  [id number]    Patient not seen today secondary to Patient unwilling to participate (Pt rude and yelling at OT to get out and leave her alone). Will follow-up next service date.    12/22/2023

## 2023-12-22 NOTE — PLAN OF CARE
12/22/23 1325   Post-Acute Status   Post-Acute Authorization Placement   Post-Acute Placement Status Pending post-acute provider review/more information requested     Discharge sent for review.

## 2023-12-22 NOTE — DISCHARGE SUMMARY
[facility] Medicine  Discharge Summary      Patient Name: Divya Meek  MRN: [id number]  YANETH: [id number]  Patient Class: IP- Inpatient  Admission Date: 12/18/2023  Hospital Length of Stay: 4 days  Discharge Date and Time:  12/22/2023 1:04 PM  Attending Physician: Tr Bruce MD   Discharging Provider: Tr Bruce MD  Primary Care Provider: Janet, Primary Doctor    Primary Care Team: Networked reference to record PCT     HPI:   [age] year female who resides in the group home, diabetes, noncompliance, she was not taking any diabetic medications for 2 years, left below-knee amputation came to hospital with posterior aspect of the heel ulcer and infection and cellulitis .  Reported ulcer to her right ankle that has begun 2 months ago and she had a fall about 1 month ago and got worse and draining .  Pain is worsening and feeling pain at the calf and came to ER .  She had left leg BKA in sept 2022. She was not taking DM medications more than 1 year ago. She did BKA in [facility] . She said she was on insulin during hospital stay   PMH as above   PSH . Thymus surgery ? For GBS , laparotomy for abdominal abscess and C section   Family history not significant        Procedure(s) (LRB):  INCISION AND DRAINAGE, ABSCESS (Right)      Hospital Course:   IR US guided aspiration ordered  Patient is upset she was NPO and wants to eat and sign out AMA. Explained risk even death to her and she hold back. But she can sign out AMA again . D/w pt and explained with RN and charge nurse     12/20  Admitted with ankle wound . But found to have very hard and swollen calf . DVT study neg. CT with possible abscess .   Today . She agree to do IR US aspiration . She is not very able to pay attention about  her sickness. She signed AMA but later stayed after discussion     12/21  Assumed care. Chart reviewed. Labs reviewed and AM labs ordered. For I & D today. No new complaints.     12/22 Discussed with ID:  "cefazolin until wound granulates, then can change to cefalexin. Has been accepted to LTAC for iv antibiotics and wound care. Wound growing Proteus and beta hemolytic. Patient feels "Good" and wants to be discharged to facility.  VSS  Lungs: no adventitious  Heart S1S2 reg  Abdo: soft  Right leg, calf wound dressed and dry.  Imp s/p abscess I&D, stable for transfer to LTAC  Plan discharge on regimen as listed below in discharge med rec     Goals of Care Treatment Preferences:  Code Status: Full Code      Consults:   Consults (From admission, onward)          Status Ordering Provider     Inpatient consult to Midline team  Once        Provider:  (Not yet assigned)    Completed DANIEL VUONG.     IP consult to case management  Once        Provider:  (Not yet assigned)    Acknowledged DANIEL VUONG.     Inpatient consult to General Surgery  Once        Provider:  Juan Carlos Meyers MD    Completed DANIEL VUONG.     Inpatient consult to Vascular Surgery  Once        Provider:  Khoobehi, Ali, MD    Completed DANIEL VUONG.            No new Assessment & Plan notes have been filed under this hospital service since the last note was generated.  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    Service: Long Term Acute Care  Contact information:  [address] [place] [address] [phone]                        "          Patient Instructions:      Diet diabetic     Activity as tolerated       Significant Diagnostic Studies: Labs: BMP:   Recent Labs   Lab 12/22/23  0514   *   *   K 3.7   CL 95   CO2 28   BUN 14   CREATININE 1.0   CALCIUM 8.5*    and CBC   Recent Labs   Lab 12/21/23  1105 12/22/23  0515   WBC 13.60* 14.97*   HGB 11.1* 11.2*   HCT 33.5* 34.1*    437       Pending Diagnostic Studies:       None           Medications:  Reconciled Home Medications:      Medication List        START taking these medications      ceFAZolin 1 g/50ml Dextrose IVPB 1 gram/50 mL  Commonly known as: ANCEF  Inject 50 mLs (1 g total) into the vein every 6 (six) hours.     insulin aspart U-100 100 unit/mL (3 mL) Inpn pen  Commonly known as: NovoLOG  Inject 0-10 Units into the skin before meals and at bedtime as needed (Hyperglycemia).              Indwelling Lines/Drains at time of discharge:   Lines/Drains/Airways       None                   Time spent on the discharge of patient: 32  minutes         Tr Bruce MD  Department of Hospital Medicine  [facility]

## 2023-12-22 NOTE — PROGRESS NOTES
No significant changes after I and D of the right calf.    Removed packing today.  Daily dressing changes after.  I will sign off.  Please call with questions.

## 2023-12-22 NOTE — NURSING
Representative from [facility] visited patient. Patient informed her that she would like her to switch her to the [facility] facility instead of the [facility] facility. Representative informed me that it will be a few hours before that was done but would reach out to me when everything including transportation was set up. Patient also aware.

## 2023-12-22 NOTE — PT/OT/SLP PROGRESS
"Physical Therapy      Patient Name:  Divya Meek   MRN:  [id number]    Patient not seen today secondary to Patient unwilling to participate, Other (Comment), Increased agitation (Pt upset that "everyone and their brother keeps coming in here" and she's been unable to rest.). Will follow-up 12/23/23.    "

## 2023-12-22 NOTE — PROGRESS NOTES
Pharmacy Consult Discontinuation: Vancomycin    Divya Meek [id number] is a 68 y.o. female was consulted for vancomycin pharmacotherapy management by pharmacy.    Pharmacy consult for vancomycin dosing is no longer required.  Vancomycin was discontinued 12/22/2023 @ 0904.    Thank you for allowing us to participate in this patient's care. Should you have any questions or concerns please feel free to contact the pharmacy department at [phone].    Christopher Shipman, PharmD

## 2023-12-22 NOTE — CARE UPDATE
"   12/21/23 2047   Patient Assessment/Suction   Level of Consciousness (AVPU) alert   Respiratory Effort Normal;Unlabored   Expansion/Accessory Muscles/Retractions no use of accessory muscles;no retractions;expansion symmetric   All Lung Fields Breath Sounds other (see comments)  (Pt became combative when entering the room. She expressed her unhappines at how many interruptions occur at the hospital and that she hasnt had the opportunity to sleep. Pt refused any assessment and had a "do not disturb" sign placed on her room's door.)   PRE-TX-O2   Resp 18   Respiratory Evaluation   $ Care Plan Tech Time 15 min   $ Eval/Re-eval Charges Re-evaluation   Evaluation For New Orders       "

## 2023-12-23 LAB
BACTERIA BLD CULT: NORMAL
BACTERIA BLD CULT: NORMAL
BACTERIA SPEC AEROBE CULT: ABNORMAL
BACTERIA SPEC ANAEROBE CULT: NORMAL

## 2023-12-23 NOTE — NURSING
Patient wound care completed after pain medication was administered. Patient refused to allow wound to be repacked. Educated patient on importance of following dr orders. Patient still noncompliant.

## 2023-12-23 NOTE — NURSING
Report called to [facility] facility in [place] to Jerald Rodriguez RN. Asked if Midline could be left in patient. Verbalized understanding of report. Informed of patient's mode of arrival as well as patient's refusal to have wound repacked.

## 2023-12-24 LAB
BACTERIA SPEC ANAEROBE CULT: NORMAL
BACTERIA SPEC ANAEROBE CULT: NORMAL

## 2024-01-21 LAB
FUNGUS SPEC CULT: NORMAL
FUNGUS SPEC CULT: NORMAL

## 2024-02-09 LAB

## (undated) DEVICE — GLOVE BIOGEL MICRO SURGEON PINK SZ 7.5

## (undated) DEVICE — DRESSING POST OP MEPILEX AG 4X6  498300

## (undated) DEVICE — UNDERGLOVE BIOGEL PI MICRO BLUE SZ 8